# Patient Record
Sex: FEMALE | Race: WHITE | NOT HISPANIC OR LATINO | Employment: PART TIME | URBAN - METROPOLITAN AREA
[De-identification: names, ages, dates, MRNs, and addresses within clinical notes are randomized per-mention and may not be internally consistent; named-entity substitution may affect disease eponyms.]

---

## 2017-03-06 ENCOUNTER — HOSPITAL ENCOUNTER (EMERGENCY)
Facility: HOSPITAL | Age: 32
Discharge: HOME | End: 2017-03-06
Attending: EMERGENCY MEDICINE
Payer: MEDICAID

## 2017-03-06 VITALS
WEIGHT: 105 LBS | BODY MASS INDEX: 19.32 KG/M2 | OXYGEN SATURATION: 100 % | TEMPERATURE: 98 F | HEIGHT: 62 IN | SYSTOLIC BLOOD PRESSURE: 132 MMHG | HEART RATE: 79 BPM | DIASTOLIC BLOOD PRESSURE: 83 MMHG | RESPIRATION RATE: 17 BRPM

## 2017-03-06 DIAGNOSIS — M79.676 TOE PAIN: ICD-10-CM

## 2017-03-06 DIAGNOSIS — S90.122A CONTUSION OF LESSER TOE OF LEFT FOOT WITHOUT DAMAGE TO NAIL, INITIAL ENCOUNTER: Primary | ICD-10-CM

## 2017-03-06 PROCEDURE — 63600175 PHARM REV CODE 636 W HCPCS: Performed by: PHYSICIAN ASSISTANT

## 2017-03-06 PROCEDURE — 96372 THER/PROPH/DIAG INJ SC/IM: CPT

## 2017-03-06 PROCEDURE — 99283 EMERGENCY DEPT VISIT LOW MDM: CPT | Mod: 25

## 2017-03-06 RX ORDER — IBUPROFEN 600 MG/1
600 TABLET ORAL EVERY 6 HOURS PRN
Qty: 30 TABLET | Refills: 0 | OUTPATIENT
Start: 2017-03-06 | End: 2019-09-08

## 2017-03-06 RX ORDER — SULFAMETHOXAZOLE AND TRIMETHOPRIM 800; 160 MG/1; MG/1
2 TABLET ORAL 2 TIMES DAILY
Qty: 28 TABLET | Refills: 0 | Status: SHIPPED | OUTPATIENT
Start: 2017-03-06 | End: 2017-03-13

## 2017-03-06 RX ORDER — KETOROLAC TROMETHAMINE 30 MG/ML
10 INJECTION, SOLUTION INTRAMUSCULAR; INTRAVENOUS
Status: COMPLETED | OUTPATIENT
Start: 2017-03-06 | End: 2017-03-06

## 2017-03-06 RX ADMIN — KETOROLAC TROMETHAMINE 10 MG: 30 INJECTION, SOLUTION INTRAMUSCULAR at 09:03

## 2017-03-06 NOTE — Clinical Note
Call and schedule appt with [facility] to establish primary care physician, and to reevaluate toe pain.    Return to ED if unable to tolerate pain, if toe becomes red/swollen/warm, if unable to tolerate medicines.

## 2017-03-06 NOTE — ED AVS SNAPSHOT
[facility]  [place] [address]               Jordana Gallardo Ryan   3/6/2017  8:12 PM   ED    Description:  Female : 1985   Department:  [facility]           Your Care was Coordinated By:     Provider Role From To    Portia Jon MD Attending Provider 17 --    Inocencio Agrawal PA-C Physician Assistant 17 --      Reason for Visit     Foot Pain           Diagnoses this Visit        Comments    Contusion of lesser toe of left foot without damage to nail, initial encounter    -  Primary     Toe pain           ED Disposition     ED Disposition Condition Comment    Discharge  Call and schedule appt with [facility] to establish primary care physician, and to reevaluate toe pain.    Return to ED if unable to tolerate pain, if toe becomes red/swollen/warm, if unable to tolerate medicines.            To Do List           Follow-up Information     Follow up with [facility]. Schedule an appointment as soon as possible for a visit in 1 week.    Why:  Call and schedule appt with [facility] to establish primary care physician, and for reevaluation of toe pain.    Contact information:    [address] [place] [facility] [id number]  [phone]          Follow up with [facility].    Specialty:  Emergency Medicine    Why:  As needed, If symptoms worsen, if unable to tolerate pain, if toe becomes red/swollen/warm.     Contact information:    [place] [id number] [phone]       These Medications        Disp Refills Start End    sulfamethoxazole-trimethoprim 800-160mg (BACTRIM DS) 800-160 mg Tab 28 tablet 0 3/6/2017 3/13/2017    Take 2 tablets by mouth 2 (two) times daily. - Oral    ibuprofen (ADVIL,MOTRIN) 600 MG tablet 30 tablet 0 3/6/2017     Take 1 tablet (600 mg total) by mouth every 6 (six) hours as needed for Pain. - Oral       [facility]     [facility] - 24/7 Assistance  Registered nurses in the [facility] provide clinical advisement, health education, appointment booking, and other advisory services.  Call for this free service at [phone].             Medications           Message regarding Medications     Verify the changes and/or additions to your medication regime listed below are the same as discussed with your clinician today.  If any of these changes or additions are incorrect, please notify your healthcare provider.        START taking these NEW medications        Refills    sulfamethoxazole-trimethoprim 800-160mg (BACTRIM DS) 800-160 mg Tab 0    Sig: Take 2 tablets by mouth 2 (two) times daily.    Class: Print    Route: Oral    ibuprofen (ADVIL,MOTRIN) 600 MG tablet 0    Sig: Take 1 tablet (600 mg total) by mouth every 6 (six) hours as needed for Pain.    Class: Print    Route: Oral      These medications were administered today        Dose Freq    ketorolac injection 10 mg 10 mg ED 1 Time    Sig: Inject 10 mg into the muscle ED 1 Time.    Class: Normal    Route: Intramuscular    Cosign for Ordering: Accepted by Portia Jon MD on 3/6/2017  9:18 PM           Verify that the below list of medications is an accurate representation of the medications you are currently taking.  If none reported, the list may be blank. If incorrect, please contact your healthcare provider. Carry this list with you in case of emergency.           Current Medications     hydrocodone-acetaminophen 5-325mg (NORCO) 5-325 mg per tablet Take 1 tablet by mouth every 6 (six) hours as needed. No alcohol, no driving, no operating machinery, no working, no swimming, no extra Tylenol (acetaminophen) while taking this medication.    ibuprofen (ADVIL,MOTRIN) 600 MG tablet Take 1 tablet (600 mg total) by mouth every 6 (six) hours as needed for Pain.    ibuprofen (ADVIL,MOTRIN) 800 MG tablet Take 800 mg by mouth 3  "(three) times daily.    sulfamethoxazole-trimethoprim 800-160mg (BACTRIM DS) 800-160 mg Tab Take 2 tablets by mouth 2 (two) times daily.           Clinical Reference Information           Your Vitals Were     BP Pulse Temp Resp Height Weight    132/83 (BP Location: Left arm, Patient Position: Sitting) 79 98.1 °F (36.7 °C) (Oral) 17 5' 2" (1.575 m) 47.6 kg (105 lb)    SpO2 BMI             100% 19.2 kg/m2         Allergies as of 3/6/2017        Reactions    Percocet [Oxycodone-acetaminophen]       Immunizations Administered on Date of Encounter - 3/6/2017     None      ED Micro, Lab, POCT     None      ED Imaging Orders     Start Ordered       Status Ordering Provider    03/06/17 2057 03/06/17 2057  X-Ray Foot Complete Left  1 time imaging      Final result         Discharge Instructions         Understanding Bone Bruise (Bone Contusion)  A bone bruise is an injury to a bone that is less severe than a bone fracture. Bone bruises are fairly common. They can happen to people of all ages. Any type of bone in your body can get a bone bruise. Other injuries often happen along with a bone bruise, such as damage to nearby ligaments.  What happens when a bone is bruised?  Bone is made of different kinds of tissue. The periosteum is a thin layer of tissue that covers most of a bone. Where bones come together, there is usually a layer of cartilage at the edges. The bone here is called subchondral bone. Deep inside the bone is an area called the medulla. It contains the bone marrow and fibrous tissue called trabeculae.  With a bone fracture, all of the trabeculae in a region of bone have broken. But with a bone bruise, an injury only damages some of these trabeculae. An injury might cause blood to build up in the area beneath the periosteum. This causes a subperiosteal hematoma, a type of bone bruise. An injury might also cause bleeding and swelling in the area between your cartilage and the bone beneath it. This causes a " subchondral bone bruise. Or bleeding and swelling can occur in the medulla of your bone. This is called an interosseous bone bruise.  What causes a bone bruise?  Injury of any kind can cause a bone bruise. Sports injuries, motor vehicle accidents, or falls from a height can cause them. Twisting injuries that cause joint sprains can also cause a bone bruise. Health conditions like arthritis may also lead to a bone bruise. This is because arthritis causes bone surfaces to grind against each other. Child abuse is another cause of bone bruises.  Symptoms of a bone bruise  Symptoms of a bone bruise can include:  · Pain and soreness in the injured area  · Swelling in the area and soft tissues around it  · Change in color of the injured area  · Swelling or stiffness of an injured joint  This pain is often more severe and lasts longer than a soft tissue injury. How severe your symptoms are and how long they last depends on how severe the bone bruise is.  Diagnosing a bone bruise  Your healthcare provider will ask you about your medical history and symptoms. He or she will ask how you got your injury. Your provider will examine the injured area to check for pain, bruising, and swelling. After the exam, your health care provider may be able to tell if you have a bone bruise.  A bone bruise doesnt show up on an X-ray. But you may be given an X-ray to rule out a bone fracture. A fracture may need a different kind of treatment. An MRI can confirm a bone bruise. But your healthcare provider will likely only give you an MRI if your symptoms dont get better.  Date Last Reviewed: 3/3/2015  © 6593-7893 [employer] [address] [place] [address], [place] [address]. All rights reserved. This information is not intended as a substitute for professional medical care. Always follow your healthcare professional's instructions.          Discharge References/Attachments     R.I.C.E. (ENGLISH)    LOWER EXTREMITY CONTUSION (ENGLISH)       [facility] Sign-Up     Activating your [facility] account is as easy as 1-2-3!     1) Visit [email or web address], select Sign Up Now, enter this activation code and your date of birth, then select Next.  [email or web address]  Expires: 4/20/2017  9:48 PM      2) Create a username and password to use when you visit [facility] in the future and select a security question in case you lose your password and select Next.    3) Enter your e-mail address and click Sign Up!    Additional Information  If you have questions, please e-mail [email or web address] or call [phone] to talk to our [facility] staff. Remember, [facility] is NOT to be used for urgent needs. For medical emergencies, dial 911.         Smoking Cessation     If you would like to quit smoking:   You may be eligible for free services if you are a [place] resident and started smoking cigarettes before September 1, 1988.  Call the Smoking Cessation Trust (SCT) toll free at (058) 678-5363 or (325) 294-4504.   Call 5-044-QUIT-NOW if you do not meet the above criteria.             [facility] complies with applicable Federal civil rights laws and does not discriminate on the basis of race, color, national origin, age, disability, or sex.        Language Assistance Services     ATTENTION: Language assistance services are available, free of charge. Please call [phone].      ATENCIÓN: Si habla español, tiene a thompson disposición servicios gratuitos de asistencia lingüística. Llame al [phone].     CHÚ Ý: N?u b?n nói Ti?ng Vi?t, có các d?ch v? h? tr? ngôn ng? mi?n phí dành cho b?n. G?i s? [phone].

## 2017-03-07 NOTE — ED PROVIDER NOTES
"Encounter Date: 3/6/2017       History     Chief Complaint   Patient presents with    Foot Pain     "This morning, I dropped a can on my toe and I feel worsening pain.      Review of patient's allergies indicates:   Allergen Reactions    Percocet [oxycodone-acetaminophen]      HPI Comments: 33yo f with no significant pmh on file presents to ED with chief complaint L toe pain after dropping can on foot earlier today. Pt states L 4th toe is bruised and has a tingling/numb sensation. She states pain is constant. She is able to ambulate, but cannot put much pressure on forefoot. Pt denies wound or bleeding. Denies alleviating factors; pain exacerbated by movement or touch.     The history is provided by the patient.     Past Medical History:   Diagnosis Date    Irregular heart rate     Ovarian cyst      Past Surgical History:   Procedure Laterality Date    DILATION AND CURETTAGE OF UTERUS      PARTIAL HYSTERECTOMY      TUBAL LIGATION       History reviewed. No pertinent family history.  Social History   Substance Use Topics    Smoking status: Current Every Day Smoker    Smokeless tobacco: None    Alcohol use No     Review of Systems   Constitutional: Negative for activity change, chills and fever.   HENT: Negative.    Eyes: Negative.    Respiratory: Negative.    Cardiovascular: Negative.    Gastrointestinal: Negative.    Endocrine: Negative.    Genitourinary: Negative.    Musculoskeletal: Negative for back pain, neck pain and neck stiffness.   Skin: Positive for color change. Negative for pallor and rash.   Allergic/Immunologic: Negative.    Neurological: Negative.    Hematological: Negative.    Psychiatric/Behavioral: Negative.    All other systems reviewed and are negative.      Physical Exam   Initial Vitals   BP Pulse Resp Temp SpO2   03/06/17 2005 03/06/17 2005 03/06/17 2005 03/06/17 2005 03/06/17 2005   132/83 79 17 98.1 °F (36.7 °C) 100 %     Physical Exam    Nursing note and vitals " reviewed.  Constitutional: She appears well-developed and well-nourished. No distress.   HENT:   Head: Normocephalic and atraumatic.   Eyes: Conjunctivae and EOM are normal.   Neck: Normal range of motion. Neck supple.   Cardiovascular: Normal heart sounds and intact distal pulses.   No murmur heard.  Pulmonary/Chest: Breath sounds normal. No respiratory distress. She has no wheezes. She has no rhonchi. She exhibits no tenderness.   Abdominal: Soft. There is no tenderness.   Musculoskeletal: She exhibits no edema.        Feet:    Lymphadenopathy:     She has no cervical adenopathy.   Neurological: She is alert and oriented to person, place, and time. She has normal strength.   Skin: Skin is warm and dry. No rash and no abscess noted. No erythema.   Psychiatric: She has a normal mood and affect. Her behavior is normal. Judgment and thought content normal.         ED Course   Procedures  Labs Reviewed - No data to display          Medical Decision Making:   Initial Assessment:   31yo f with chief complaint toe pain after dropping object on toe earlier today  Differential Diagnosis:   Contusion, fracture of foot/toe, crush injury  Clinical Tests:   Radiological Study: Ordered and Reviewed  ED Management:  Pt is overall well-appearing, in NAD, afebrile, vitals WNL. She does have obvious ecchymosis to toe with decreased ROM 2/2 pain. Xray without evidence of fracture or dislocation. No bony deformity via imaging. I do think this is consistent with a contusion of the toe without fracture or open wound. Cap refill <3 sec. 2+DP to involved foot. I do think she would benefit from short course of antibiotics because of the mechanism of this injury being a crush injury, and also NSAIDs for pain. RICE instructions given. I will give her information for f/u with [facility]. Return precautions given.  Other:   I have discussed this case with another health care provider.       <> Summary of the Discussion: I did discuss  this case with .                   ED Course     Clinical Impression:   The primary encounter diagnosis was Contusion of lesser toe of left foot without damage to nail, initial encounter. A diagnosis of Toe pain was also pertinent to this visit.    Disposition:   Disposition: Discharged  Condition: Stable       Inocencio Agrawal PA-C  03/07/17 0010

## 2017-03-07 NOTE — ED TRIAGE NOTES
Patient states she dropped a big can cheese on her left foot at 10 am. Patient is able to walk on her foot.

## 2017-03-07 NOTE — DISCHARGE INSTRUCTIONS
Understanding Bone Bruise (Bone Contusion)  A bone bruise is an injury to a bone that is less severe than a bone fracture. Bone bruises are fairly common. They can happen to people of all ages. Any type of bone in your body can get a bone bruise. Other injuries often happen along with a bone bruise, such as damage to nearby ligaments.  What happens when a bone is bruised?  Bone is made of different kinds of tissue. The periosteum is a thin layer of tissue that covers most of a bone. Where bones come together, there is usually a layer of cartilage at the edges. The bone here is called subchondral bone. Deep inside the bone is an area called the medulla. It contains the bone marrow and fibrous tissue called trabeculae.  With a bone fracture, all of the trabeculae in a region of bone have broken. But with a bone bruise, an injury only damages some of these trabeculae. An injury might cause blood to build up in the area beneath the periosteum. This causes a subperiosteal hematoma, a type of bone bruise. An injury might also cause bleeding and swelling in the area between your cartilage and the bone beneath it. This causes a subchondral bone bruise. Or bleeding and swelling can occur in the medulla of your bone. This is called an interosseous bone bruise.  What causes a bone bruise?  Injury of any kind can cause a bone bruise. Sports injuries, motor vehicle accidents, or falls from a height can cause them. Twisting injuries that cause joint sprains can also cause a bone bruise. Health conditions like arthritis may also lead to a bone bruise. This is because arthritis causes bone surfaces to grind against each other. Child abuse is another cause of bone bruises.  Symptoms of a bone bruise  Symptoms of a bone bruise can include:  · Pain and soreness in the injured area  · Swelling in the area and soft tissues around it  · Change in color of the injured area  · Swelling or stiffness of an injured joint  This pain is often  more severe and lasts longer than a soft tissue injury. How severe your symptoms are and how long they last depends on how severe the bone bruise is.  Diagnosing a bone bruise  Your healthcare provider will ask you about your medical history and symptoms. He or she will ask how you got your injury. Your provider will examine the injured area to check for pain, bruising, and swelling. After the exam, your health care provider may be able to tell if you have a bone bruise.  A bone bruise doesnt show up on an X-ray. But you may be given an X-ray to rule out a bone fracture. A fracture may need a different kind of treatment. An MRI can confirm a bone bruise. But your healthcare provider will likely only give you an MRI if your symptoms dont get better.  Date Last Reviewed: 3/3/2015  © 4934-7956 [employer], [employer]. [address] [place] [address]. All rights reserved. This information is not intended as a substitute for professional medical care. Always follow your healthcare professional's instructions.

## 2017-07-06 ENCOUNTER — HOSPITAL ENCOUNTER (EMERGENCY)
Facility: OTHER | Age: 32
Discharge: HOME | End: 2017-07-06
Attending: EMERGENCY MEDICINE
Payer: MEDICAID

## 2017-07-06 VITALS
WEIGHT: 107 LBS | OXYGEN SATURATION: 99 % | DIASTOLIC BLOOD PRESSURE: 80 MMHG | SYSTOLIC BLOOD PRESSURE: 121 MMHG | BODY MASS INDEX: 19.69 KG/M2 | RESPIRATION RATE: 17 BRPM | HEART RATE: 78 BPM | HEIGHT: 62 IN | TEMPERATURE: 98 F

## 2017-07-06 DIAGNOSIS — R18.8 FREE FLUID IN PELVIS: ICD-10-CM

## 2017-07-06 DIAGNOSIS — R10.31 RIGHT LOWER QUADRANT ABDOMINAL PAIN: Primary | ICD-10-CM

## 2017-07-06 DIAGNOSIS — R10.31 RIGHT LOWER QUADRANT PAIN: ICD-10-CM

## 2017-07-06 LAB
ALBUMIN SERPL-MCNC: 4 G/DL (ref 3.3–5.5)
ALP SERPL-CCNC: 47 U/L (ref 42–141)
BILIRUB SERPL-MCNC: 0.9 MG/DL (ref 0.2–1.6)
BILIRUBIN, POC UA: NEGATIVE
BLOOD, POC UA: NEGATIVE
BUN SERPL-MCNC: 6 MG/DL (ref 7–22)
CALCIUM SERPL-MCNC: 9.6 MG/DL (ref 8–10.3)
CHLORIDE SERPL-SCNC: 103 MMOL/L (ref 98–108)
CLARITY, POC UA: CLEAR
COLOR, POC UA: ABNORMAL
CREAT SERPL-MCNC: 0.3 MG/DL (ref 0.6–1.2)
GLUCOSE SERPL-MCNC: 72 MG/DL (ref 73–118)
GLUCOSE, POC UA: NEGATIVE
KETONES, POC UA: NEGATIVE
LEUKOCYTE EST, POC UA: NEGATIVE
NITRITE, POC UA: NEGATIVE
PH UR STRIP: 7 [PH]
POC ALT (SGPT): 17 U/L (ref 10–47)
POC AST (SGOT): 29 U/L (ref 11–38)
POC TCO2: 27 MMOL/L (ref 18–33)
POTASSIUM BLD-SCNC: 3.9 MMOL/L (ref 3.6–5.1)
PROTEIN, POC UA: NEGATIVE
PROTEIN, POC: 6.8 G/DL (ref 6.4–8.1)
SODIUM BLD-SCNC: 137 MMOL/L (ref 128–145)
SPECIFIC GRAVITY, POC UA: 1.01
UROBILINOGEN, POC UA: 0.2 E.U./DL

## 2017-07-06 PROCEDURE — 96372 THER/PROPH/DIAG INJ SC/IM: CPT

## 2017-07-06 PROCEDURE — 96375 TX/PRO/DX INJ NEW DRUG ADDON: CPT

## 2017-07-06 PROCEDURE — 63600175 PHARM REV CODE 636 W HCPCS: Performed by: EMERGENCY MEDICINE

## 2017-07-06 PROCEDURE — 96374 THER/PROPH/DIAG INJ IV PUSH: CPT

## 2017-07-06 PROCEDURE — 85025 COMPLETE CBC W/AUTO DIFF WBC: CPT

## 2017-07-06 PROCEDURE — 25000003 PHARM REV CODE 250: Performed by: EMERGENCY MEDICINE

## 2017-07-06 PROCEDURE — 96361 HYDRATE IV INFUSION ADD-ON: CPT

## 2017-07-06 PROCEDURE — 99284 EMERGENCY DEPT VISIT MOD MDM: CPT | Mod: 25

## 2017-07-06 PROCEDURE — 80053 COMPREHEN METABOLIC PANEL: CPT

## 2017-07-06 PROCEDURE — 87086 URINE CULTURE/COLONY COUNT: CPT

## 2017-07-06 PROCEDURE — 25500020 PHARM REV CODE 255: Performed by: EMERGENCY MEDICINE

## 2017-07-06 PROCEDURE — 81003 URINALYSIS AUTO W/O SCOPE: CPT

## 2017-07-06 RX ORDER — MORPHINE SULFATE 10 MG/ML
4 INJECTION INTRAMUSCULAR; INTRAVENOUS; SUBCUTANEOUS
Status: COMPLETED | OUTPATIENT
Start: 2017-07-06 | End: 2017-07-06

## 2017-07-06 RX ORDER — ONDANSETRON 2 MG/ML
4 INJECTION INTRAMUSCULAR; INTRAVENOUS
Status: COMPLETED | OUTPATIENT
Start: 2017-07-06 | End: 2017-07-06

## 2017-07-06 RX ORDER — IBUPROFEN 600 MG/1
600 TABLET ORAL EVERY 8 HOURS PRN
Qty: 15 TABLET | Refills: 0 | OUTPATIENT
Start: 2017-07-06 | End: 2019-09-08

## 2017-07-06 RX ORDER — TRAMADOL HYDROCHLORIDE 50 MG/1
50 TABLET ORAL EVERY 8 HOURS PRN
Qty: 12 TABLET | Refills: 0 | Status: SHIPPED | OUTPATIENT
Start: 2017-07-06 | End: 2017-07-16

## 2017-07-06 RX ORDER — SODIUM CHLORIDE 9 MG/ML
1000 INJECTION, SOLUTION INTRAVENOUS
Status: COMPLETED | OUTPATIENT
Start: 2017-07-06 | End: 2017-07-06

## 2017-07-06 RX ORDER — KETOROLAC TROMETHAMINE 30 MG/ML
15 INJECTION, SOLUTION INTRAMUSCULAR; INTRAVENOUS ONCE
Status: COMPLETED | OUTPATIENT
Start: 2017-07-06 | End: 2017-07-06

## 2017-07-06 RX ADMIN — IOHEXOL 75 ML: 350 INJECTION, SOLUTION INTRAVENOUS at 01:07

## 2017-07-06 RX ADMIN — KETOROLAC TROMETHAMINE 15 MG: 30 INJECTION, SOLUTION INTRAMUSCULAR at 12:07

## 2017-07-06 RX ADMIN — SODIUM CHLORIDE 1000 ML: 0.9 INJECTION, SOLUTION INTRAVENOUS at 12:07

## 2017-07-06 RX ADMIN — MORPHINE SULFATE 4 MG: 10 INJECTION INTRAVENOUS at 04:07

## 2017-07-06 RX ADMIN — ONDANSETRON 4 MG: 2 INJECTION INTRAMUSCULAR; INTRAVENOUS at 04:07

## 2017-07-06 NOTE — ED PROVIDER NOTES
Encounter Date: 7/6/2017       History     Chief Complaint   Patient presents with    Abdominal Pain     reports constant right lower abd radiating to right lower back, onset 2 days ago      Ms Davis reports 4 days of RLQ pain, intermittently radiating to R flank. Positive mild anorexia w sweats, no chills or fever. Hx of hysterectomy years ago. Pain is not sharp at any time, but is constant. No urinary sxs. Reports hx of ovarian cysts in past causing pain, last was a year ago, but this feels different, lasting longer, more pain. Also feels bloated after eating.       The history is provided by the patient.     Review of patient's allergies indicates:   Allergen Reactions    Percocet [oxycodone-acetaminophen]      Past Medical History:   Diagnosis Date    Irregular heart rate     Ovarian cyst      Past Surgical History:   Procedure Laterality Date    DILATION AND CURETTAGE OF UTERUS      PARTIAL HYSTERECTOMY      TUBAL LIGATION       History reviewed. No pertinent family history.  Social History   Substance Use Topics    Smoking status: Current Every Day Smoker    Smokeless tobacco: Never Used    Alcohol use No     Review of Systems   Constitutional: Positive for diaphoresis. Negative for appetite change, chills, fatigue and fever.   Respiratory: Negative.    Cardiovascular: Negative.    Gastrointestinal: Positive for abdominal pain. Negative for constipation, diarrhea, nausea and vomiting.   Musculoskeletal: Negative.    All other systems reviewed and are negative.      Physical Exam     Initial Vitals [07/06/17 1144]   BP Pulse Resp Temp SpO2   125/75 89 17 98.8 °F (37.1 °C) 98 %      MAP       91.67         Physical Exam    Nursing note and vitals reviewed.  Constitutional: She appears well-developed and well-nourished. She is not diaphoretic. No distress.   HENT:   Head: Normocephalic and atraumatic.   Eyes: EOM are normal. Pupils are equal, round, and reactive to light.   Neck: Normal range of  motion. Neck supple.   Cardiovascular: Normal rate, regular rhythm and normal heart sounds.   No murmur heard.  Pulmonary/Chest: Breath sounds normal. She has no wheezes.   Abdominal: Soft. She exhibits no mass. There is tenderness. There is no rebound and no guarding.   Diffuse  RLQ ttp. No flank ttp. Mild bloating noted, mild lower abd distension   Musculoskeletal: Normal range of motion. She exhibits no edema or tenderness.   Neurological: She is alert and oriented to person, place, and time. She has normal strength. No cranial nerve deficit.   Skin: Skin is warm and dry. No rash noted. No erythema.   Psychiatric: She has a normal mood and affect. Her behavior is normal. Thought content normal.         ED Course   Procedures  Labs Reviewed   POCT URINALYSIS W/O SCOPE - Abnormal; Notable for the following:        Result Value    Glucose, UA Negative (*)     Bilirubin, UA Negative (*)     Ketones, UA Negative (*)     Blood, UA Negative (*)     Protein, UA Negative (*)     Nitrite, UA Negative (*)     Leukocytes, UA Negative (*)     All other components within normal limits   POCT CMP - Abnormal; Notable for the following:     POC BUN 6 (*)     POC Creatinine 0.3 (*)     POC Glucose 72 (*)     All other components within normal limits   CULTURE, URINE   POCT CBC   POCT URINALYSIS W/O SCOPE   POCT CMP             Medical Decision Making:   Clinical Tests:   Lab Tests: Ordered and Reviewed  The following lab test(s) were unremarkable: CBC, CMP and Urinalysis  Radiological Study: Ordered and Reviewed  ED Management:  Ms Davis has been stable during her time in the ER and is stable for discharge. She wants to go home. She feels most pain was felt with manipulation of R adnexa during pelvic US. Results discussed w pt. ddx includes ruptured ovarian cyst, endometrial tissue causing R ovarian pain, other pathology. She will f/u w obgyn. We discussed worrisome signs that should prompt need to return to er should they  occur. There is no indication for further emergent intervention or evaluation at this time.              Imaging Results          US Pelvis Comp with Transvag NON-OB (xpd) (Final result)  Result time 07/06/17 16:45:05   Procedure changed from US Pelvis Limited Non OB     Final result by Vani Du MD (07/06/17 16:45:05)                 Impression:      The patient is status post hysterectomy.      Electronically signed by: VANI DU MD  Date:     07/06/17  Time:    16:45              Narrative:    Pelvic ultrasound    History: Right lower quadrant pain    Comparison: None    Technique: Transabdominal and transvaginal gray scale, spectral Doppler and color Doppler imaging of the pelvis was acquired.    Findings:  The uterus has been removed.     The right ovary measures 2.5 by 2.4 by 2.6 centimeters. It has a normal sonographic appearance with normal flow.     The left ovary measures 3.2 by 3.5 by 2.0 centimeters. It has a normal sonographic appearance with normal flow.     There is a physiologic amount of free fluid seen within the pelvis.                             CT Abdomen Pelvis With Contrast (Final result)  Result time 07/06/17 14:11:17    Final result by Santana Mcpherson MD (07/06/17 14:11:17)                 Impression:      1.  No evidence for appendicitis.  2.  Trace free pelvic fluid, likely physiologic.      Electronically signed by: SANTANA MCPHERSON MD  Date:     07/06/17  Time:    14:11              Narrative:    CT abdomen and pelvis    Technique: Helical CT scan abdomen and pelvis performed with 75 mL Omnipaque 350 intravenous contrast.    Comparison: 3/17/16.    Findings:    Lung bases are clear.  No pericardial or pleural effusion.    Liver, gallbladder, pancreas, spleen, adrenal glands, and kidneys are unremarkable.  No biliary dilatation.  No hydronephrosis.    GI tract is nonobstructed.  The appendix is normal caliber.    Status post hysterectomy.  No adnexal masses.  Trace free fluid  noted in the pelvis.  Urinary bladder is unremarkable.    No retroperitoneal or mesenteric lymphadenopathy.  Abdominal aorta is normal caliber.    Regional osseous structures demonstrate no aggressive appearing lytic or blastic lesions.                                  Admission on 07/06/2017, Discharged on 07/06/2017   Component Date Value Ref Range Status    Glucose, UA 07/06/2017 Negative*  Final    Bilirubin, UA 07/06/2017 Negative*  Final    Ketones, UA 07/06/2017 Negative*  Final    Spec Grav UA 07/06/2017 1.010   Final    Blood, UA 07/06/2017 Negative*  Final    PH, UA 07/06/2017 7.0   Final    Protein, UA 07/06/2017 Negative*  Final    Urobilinogen, UA 07/06/2017 0.2  E.U./dL Final    Nitrite, UA 07/06/2017 Negative*  Final    Leukocytes, UA 07/06/2017 Negative*  Final    Color, UA 07/06/2017 Light yellow   Final    Clarity, UA 07/06/2017 Clear   Final    Urine Culture, Routine 07/07/2017 No significant growth   Final    Albumin, POC 07/06/2017 4.0  3.3 - 5.5 g/dL Final    Alkaline Phosphatase, POC 07/06/2017 47  42 - 141 U/L Final    ALT (SGPT), POC 07/06/2017 17  10 - 47 U/L Final    AST (SGOT), POC 07/06/2017 29  11 - 38 U/L Final    POC BUN 07/06/2017 6* 7 - 22 mg/dL Final    Calcium, POC 07/06/2017 9.6  8.0 - 10.3 mg/dL Final    POC Chloride 07/06/2017 103  98 - 108 mmol/L Final    POC Creatinine 07/06/2017 0.3* 0.6 - 1.2 mg/dL Final    POC Glucose 07/06/2017 72* 73 - 118 mg/dL Final    POC Potassium 07/06/2017 3.9  3.6 - 5.1 mmol/L Final    POC Sodium 07/06/2017 137  128 - 145 mmol/L Final    Bilirubin 07/06/2017 0.9  0.2 - 1.6 mg/dL Final    POC TCO2 07/06/2017 27  18 - 33 mmol/L Final    Protein 07/06/2017 6.8  6.4 - 8.1 g/dL Final               ED Course     Clinical Impression:   The primary encounter diagnosis was Right lower quadrant abdominal pain. Diagnoses of Right lower quadrant pain and Free fluid in pelvis were also pertinent to this visit.                            Coni Zimmer MD  07/11/17 1318       Coni Zimmer MD  07/11/17 1317

## 2017-07-06 NOTE — DISCHARGE INSTRUCTIONS
Rest. Drink plenty of fluids. Return for any new or acute problems or concerns. Follow up with your primary obgyn in a few days for recheck.

## 2017-07-07 LAB — BACTERIA UR CULT: NORMAL

## 2018-02-28 ENCOUNTER — HOSPITAL ENCOUNTER (EMERGENCY)
Facility: OTHER | Age: 33
Discharge: HOME | End: 2018-02-28
Attending: EMERGENCY MEDICINE
Payer: MEDICAID

## 2018-02-28 VITALS
SYSTOLIC BLOOD PRESSURE: 162 MMHG | TEMPERATURE: 99 F | WEIGHT: 106 LBS | RESPIRATION RATE: 18 BRPM | OXYGEN SATURATION: 98 % | HEIGHT: 62 IN | DIASTOLIC BLOOD PRESSURE: 97 MMHG | BODY MASS INDEX: 19.51 KG/M2 | HEART RATE: 78 BPM

## 2018-02-28 DIAGNOSIS — S16.1XXA CERVICAL STRAIN, ACUTE, INITIAL ENCOUNTER: Primary | ICD-10-CM

## 2018-02-28 DIAGNOSIS — R52 PAIN: ICD-10-CM

## 2018-02-28 PROCEDURE — 63600175 PHARM REV CODE 636 W HCPCS: Performed by: EMERGENCY MEDICINE

## 2018-02-28 PROCEDURE — 99284 EMERGENCY DEPT VISIT MOD MDM: CPT | Mod: 25

## 2018-02-28 PROCEDURE — 96372 THER/PROPH/DIAG INJ SC/IM: CPT

## 2018-02-28 RX ORDER — TRAMADOL HYDROCHLORIDE 50 MG/1
50 TABLET ORAL EVERY 6 HOURS PRN
Qty: 12 TABLET | Refills: 0 | Status: SHIPPED | OUTPATIENT
Start: 2018-02-28 | End: 2018-03-10

## 2018-02-28 RX ORDER — PREDNISONE 10 MG/1
10 TABLET ORAL DAILY
Qty: 5 TABLET | Refills: 0 | Status: SHIPPED | OUTPATIENT
Start: 2018-02-28 | End: 2018-03-05

## 2018-02-28 RX ORDER — KETOROLAC TROMETHAMINE 30 MG/ML
30 INJECTION, SOLUTION INTRAMUSCULAR; INTRAVENOUS
Status: COMPLETED | OUTPATIENT
Start: 2018-02-28 | End: 2018-02-28

## 2018-02-28 RX ORDER — METHOCARBAMOL 500 MG/1
1000 TABLET, FILM COATED ORAL 3 TIMES DAILY
Qty: 15 TABLET | Refills: 0 | Status: SHIPPED | OUTPATIENT
Start: 2018-02-28 | End: 2018-03-05

## 2018-02-28 RX ORDER — ORPHENADRINE CITRATE 30 MG/ML
60 INJECTION INTRAMUSCULAR; INTRAVENOUS
Status: COMPLETED | OUTPATIENT
Start: 2018-02-28 | End: 2018-02-28

## 2018-02-28 RX ADMIN — KETOROLAC TROMETHAMINE 30 MG: 30 INJECTION, SOLUTION INTRAMUSCULAR at 10:02

## 2018-02-28 RX ADMIN — ORPHENADRINE CITRATE 60 MG: 30 INJECTION INTRAMUSCULAR; INTRAVENOUS at 10:02

## 2018-03-01 NOTE — ED TRIAGE NOTES
Pt arrived with complaint of rt neck pain that radiates down rt arm after carrying a laundry basket. Pt states she has admin herself tylenol #3 aprroximately 1600 with no relief.

## 2018-03-01 NOTE — ED PROVIDER NOTES
Encounter Date: 2/28/2018       History     Chief Complaint   Patient presents with    Neck Pain     This patient states that she was carrying a heavy laundry basket went to put on a counter she missed the counter and jolted her right neck area she is complaining of pain in her right shoulder girdle      The history is provided by the patient.   Neck Pain    This is a new problem. The current episode started yesterday. The problem occurs constantly. The problem has been unchanged. Associated with: See above. The pain is present in the right side. The pain radiates to the right shoulder. The pain is at a severity of 7/10. The symptoms are aggravated by bending, twisting and position. The pain is the same all the time. Pertinent negatives include no photophobia, no visual change, no chest pain, no syncope, no numbness, no weight loss, no headaches, no bowel incontinence, no bladder incontinence, no leg pain, no paresis, no tingling and no weakness. She has tried nothing for the symptoms.     Review of patient's allergies indicates:   Allergen Reactions    Percocet [oxycodone-acetaminophen]      Past Medical History:   Diagnosis Date    Irregular heart rate     Ovarian cyst      Past Surgical History:   Procedure Laterality Date    DILATION AND CURETTAGE OF UTERUS      PARTIAL HYSTERECTOMY      TUBAL LIGATION       History reviewed. No pertinent family history.  Social History   Substance Use Topics    Smoking status: Current Every Day Smoker    Smokeless tobacco: Never Used    Alcohol use No     Review of Systems   Constitutional: Negative.  Negative for weight loss.   HENT: Negative.    Eyes: Negative.  Negative for photophobia.   Respiratory: Negative.    Cardiovascular: Negative.  Negative for chest pain and syncope.   Gastrointestinal: Negative.  Negative for bowel incontinence.   Endocrine: Negative.    Genitourinary: Negative.  Negative for bladder incontinence.   Musculoskeletal: Positive for neck  pain.   Skin: Negative.    Allergic/Immunologic: Negative.    Neurological: Negative.  Negative for tingling, weakness, numbness and headaches.   Hematological: Negative.    Psychiatric/Behavioral: Negative.    All other systems reviewed and are negative.      Physical Exam     Initial Vitals [02/28/18 2037]   BP Pulse Resp Temp SpO2   (!) 162/97 78 18 98.6 °F (37 °C) 98 %      MAP       118.67         Physical Exam    Nursing note and vitals reviewed.  Constitutional: Vital signs are normal. She appears well-developed. She is active and cooperative.   HENT:   Head: Normocephalic and atraumatic.   Eyes: Conjunctivae, EOM and lids are normal. Pupils are equal, round, and reactive to light.   Neck: Trachea normal and full passive range of motion without pain. Neck supple. No thyroid mass present. No spinous process tenderness and no muscular tenderness present. Decreased range of motion present. No edema and no erythema present. No neck rigidity.       Cardiovascular: Normal rate, regular rhythm, S1 normal, S2 normal, normal heart sounds, intact distal pulses and normal pulses.   Pulmonary/Chest: Effort normal and breath sounds normal.   Abdominal: Soft. Normal appearance, normal aorta and bowel sounds are normal.   Lymphadenopathy:     She has no axillary adenopathy.   Neurological: She is alert and oriented to person, place, and time. She has normal strength. No cranial nerve deficit or sensory deficit. She displays a negative Romberg sign. GCS eye subscore is 4. GCS verbal subscore is 5. GCS motor subscore is 6.   Skin: Skin is warm, dry and intact.   Psychiatric: She has a normal mood and affect. Her speech is normal and behavior is normal. Judgment and thought content normal. Cognition and memory are normal.         ED Course   Procedures  Labs Reviewed - No data to display              Imaging Results          X-Ray Cervical Spine AP And Lateral (Final result)  Result time 02/28/18 22:58:42    Final result by  Guero Cooper MD (02/28/18 22:58:42)                 Impression:      Straightening with reversal of the normal cervical lordosis.  No evidence of acute fracture or subluxation.      Electronically signed by: GUERO COOPER MD  Date:     02/28/18  Time:    22:58              Narrative:    Cervical spine AP, lateral, and odontoid views.  Comparison: None.    There is straightening with reversal of the normal cervical lordosis.  No evidence of acute cervical spine fracture or subluxation.  Odontoid process is intact.  Vertebral body heights and intervertebral disc spaces are maintained.  Surrounding soft tissues show no significant abnormalities.  Visualized lung apices are clear.                                                Clinical Impression:   The primary encounter diagnosis was Cervical strain, acute, initial encounter. A diagnosis of Pain was also pertinent to this visit.                           Federico Cantu MD  02/28/18 1171

## 2018-07-25 ENCOUNTER — HOSPITAL ENCOUNTER (EMERGENCY)
Facility: HOSPITAL | Age: 33
Discharge: HOME | End: 2018-07-25
Attending: EMERGENCY MEDICINE
Payer: MEDICAID

## 2018-07-25 VITALS
TEMPERATURE: 98 F | DIASTOLIC BLOOD PRESSURE: 93 MMHG | HEART RATE: 78 BPM | RESPIRATION RATE: 16 BRPM | SYSTOLIC BLOOD PRESSURE: 143 MMHG | WEIGHT: 110 LBS | OXYGEN SATURATION: 99 % | BODY MASS INDEX: 20.12 KG/M2

## 2018-07-25 DIAGNOSIS — F41.9 ANXIETY: ICD-10-CM

## 2018-07-25 DIAGNOSIS — M54.42 ACUTE LEFT-SIDED LOW BACK PAIN WITH LEFT-SIDED SCIATICA: Primary | ICD-10-CM

## 2018-07-25 DIAGNOSIS — R53.83 FATIGUE, UNSPECIFIED TYPE: ICD-10-CM

## 2018-07-25 LAB
ALBUMIN SERPL-MCNC: 4.7 G/DL (ref 3.3–5.5)
ALP SERPL-CCNC: 68 U/L (ref 42–141)
BILIRUB SERPL-MCNC: 0.6 MG/DL (ref 0.2–1.6)
BILIRUBIN, POC UA: NEGATIVE
BLOOD, POC UA: ABNORMAL
BUN SERPL-MCNC: 11 MG/DL (ref 7–22)
CALCIUM SERPL-MCNC: 9.8 MG/DL (ref 8–10.3)
CHLORIDE SERPL-SCNC: 101 MMOL/L (ref 98–108)
CLARITY, POC UA: CLEAR
COLOR, POC UA: YELLOW
CREAT SERPL-MCNC: 0.7 MG/DL (ref 0.6–1.2)
GLUCOSE SERPL-MCNC: 80 MG/DL (ref 73–118)
GLUCOSE, POC UA: NEGATIVE
KETONES, POC UA: ABNORMAL
LEUKOCYTE EST, POC UA: NEGATIVE
NITRITE, POC UA: NEGATIVE
PH UR STRIP: 6.5 [PH]
POC ALT (SGPT): 14 U/L (ref 10–47)
POC AST (SGOT): 28 U/L (ref 11–38)
POC TCO2: 27 MMOL/L (ref 18–33)
POTASSIUM BLD-SCNC: 3.7 MMOL/L (ref 3.6–5.1)
PROTEIN, POC UA: ABNORMAL
PROTEIN, POC: 7.2 G/DL (ref 6.4–8.1)
SODIUM BLD-SCNC: 140 MMOL/L (ref 128–145)
SPECIFIC GRAVITY, POC UA: >=1.03
UROBILINOGEN, POC UA: 0.2 E.U./DL

## 2018-07-25 PROCEDURE — 25000003 PHARM REV CODE 250: Performed by: EMERGENCY MEDICINE

## 2018-07-25 PROCEDURE — 80053 COMPREHEN METABOLIC PANEL: CPT

## 2018-07-25 PROCEDURE — 81003 URINALYSIS AUTO W/O SCOPE: CPT

## 2018-07-25 PROCEDURE — 85025 COMPLETE CBC W/AUTO DIFF WBC: CPT

## 2018-07-25 RX ORDER — METHYLPREDNISOLONE 4 MG/1
TABLET ORAL
Qty: 1 PACKAGE | Refills: 0 | OUTPATIENT
Start: 2018-07-25 | End: 2019-09-08

## 2018-07-25 RX ORDER — CYCLOBENZAPRINE HCL 10 MG
10 TABLET ORAL 3 TIMES DAILY PRN
Qty: 15 TABLET | Refills: 0 | Status: SHIPPED | OUTPATIENT
Start: 2018-07-25 | End: 2018-07-30

## 2018-07-25 RX ORDER — BACLOFEN 10 MG/1
10 TABLET ORAL 3 TIMES DAILY
COMMUNITY
End: 2022-01-02

## 2018-07-25 RX ORDER — KETOROLAC TROMETHAMINE 10 MG/1
10 TABLET, FILM COATED ORAL EVERY 6 HOURS
Qty: 20 TABLET | Refills: 0 | OUTPATIENT
Start: 2018-07-25 | End: 2019-09-08

## 2018-07-25 RX ADMIN — SODIUM CHLORIDE 1000 ML: 0.9 INJECTION, SOLUTION INTRAVENOUS at 02:07

## 2018-07-25 NOTE — ED PROVIDER NOTES
"Encounter Date: 7/25/2018    SCRIBE #1 NOTE: I, Alexander Andres, am scribing for, and in the presence of,  Dr. Denney . I have scribed the entire note.       History     Chief Complaint   Patient presents with    Back Pain     Pt states," Lower back pain for two weeks, now I just feel like I have no energy."     Time patient was seen by the provider: 1:54 PM      The patient is a 33 y.o. female with co-morbidities including: anxiety and back problems who presents to the ED with a complaint of constant left back pain that radiates to her left thigh for 3 weeks. Patient denies  falling. She  walks and climbs stairs often. When climbing the stairs, she sometimes catches herself on a high step but has not fallen.  She rates her back pain as a 8/10 in severity. She notes that pain worsens with certain movements.  Pain radiates down her left leg but she denies weakness or numbness to the leg.    Patient also presents to the ED feelings of fatigue for the past month. She thinks it may be due to her anxiety. Patient notes that she has been under stress lately. She notes that she had an anxiety attack a month ago and was prescribed a muscle relaxer.  She denies the muscle relaxer alleviating any symptoms.  She endorses associated vomiting. She denies any diarrhea.               Review of patient's allergies indicates:   Allergen Reactions    Percocet [oxycodone-acetaminophen]      Past Medical History:   Diagnosis Date    Irregular heart rate     Ovarian cyst      Past Surgical History:   Procedure Laterality Date    DILATION AND CURETTAGE OF UTERUS      PARTIAL HYSTERECTOMY      TUBAL LIGATION       History reviewed. No pertinent family history.  Social History   Substance Use Topics    Smoking status: Current Every Day Smoker    Smokeless tobacco: Never Used    Alcohol use No     Review of Systems   Constitutional: Positive for fatigue. Negative for chills.   HENT: Positive for nosebleeds. Negative for " congestion, postnasal drip, rhinorrhea and sore throat.    Respiratory: Negative for cough.    Cardiovascular: Negative for chest pain.   Gastrointestinal: Positive for nausea and vomiting. Negative for diarrhea.   Genitourinary: Negative for dysuria and hematuria.   Musculoskeletal: Positive for back pain.   Neurological: Negative for weakness and numbness.   Psychiatric/Behavioral: The patient is nervous/anxious.        Physical Exam     Initial Vitals [07/25/18 1344]   BP Pulse Resp Temp SpO2   (!) 139/97 76 16 98 °F (36.7 °C) 100 %      MAP       --         Physical Exam    Nursing note and vitals reviewed.  Constitutional: She appears well-developed and well-nourished.   HENT:   Head: Normocephalic and atraumatic.   Mouth/Throat: Oropharynx is clear and moist.   Eyes: Conjunctivae are normal.   Neck: Normal range of motion. Neck supple.   Cardiovascular: Intact distal pulses.   Pulmonary/Chest: No respiratory distress. She has no wheezes.   Abdominal: She exhibits no distension.   Musculoskeletal:        Back:    Neurological: She is alert and oriented to person, place, and time. She has normal strength. No sensory deficit.   Negative Straight Leg Test.      Skin: Capillary refill takes less than 2 seconds.   Psychiatric: She has a normal mood and affect. Her behavior is normal.         ED Course   Procedures  Labs Reviewed   POCT URINALYSIS W/O SCOPE - Abnormal; Notable for the following:        Result Value    Glucose, UA Negative (*)     Bilirubin, UA Negative (*)     Ketones, UA 3+ (*)     Spec Grav UA >=1.030 (*)     Blood, UA 2+ (*)     Protein, UA 1+ (*)     Nitrite, UA Negative (*)     Leukocytes, UA Negative (*)     All other components within normal limits   POCT URINALYSIS W/O SCOPE          Imaging Results    None          Medical Decision Making:   Initial Assessment:   The patient is a 33 y.o. female with co-morbidities including: anxiety, back problems,  who presents to the ED with a complaint of  constant left back pain that radiates to her thigh for 3 weeks. She denies any dysuria or hematuria.      Patient also presents to the ED feelings of fatigue for the past month.      Her exam is significant for tenderness along her left back and buttock area.  and a negative straight leg raise test.   Clinical Tests:   Lab Tests: Ordered and Reviewed  ED Management:  Urinalysis had 2+ ketones.  Therefore,  I will order an IV of sodium chloride for symptomatic relief. I will order a CBC, CMP  Patient's lab showed no significant abnormalities.  She felt better after the medications provided.            Scribe Attestation:   Scribe #1: I performed the above scribed service and the documentation accurately describes the services I performed. I attest to the accuracy of the note.              I, Dr. Priya Denney, personally performed the services described in this documentation. All medical record entries made by the scribe were at my direction and in my presence.  I have reviewed the chart and agree that the record reflects my personal performance and is accurate and complete. Priya Denney MD.  5:38 PM 07/25/2018       Clinical Impression:     1. Acute left-sided low back pain with left-sided sciatica    2. Fatigue, unspecified type    3. Anxiety                                   Priya Denney MD  07/25/18 3858

## 2019-02-03 ENCOUNTER — HOSPITAL ENCOUNTER (EMERGENCY)
Facility: HOSPITAL | Age: 34
Discharge: HOME | End: 2019-02-03
Attending: INTERNAL MEDICINE
Payer: MEDICAID

## 2019-02-03 VITALS
WEIGHT: 110 LBS | SYSTOLIC BLOOD PRESSURE: 123 MMHG | DIASTOLIC BLOOD PRESSURE: 74 MMHG | TEMPERATURE: 98 F | RESPIRATION RATE: 19 BRPM | OXYGEN SATURATION: 98 % | HEIGHT: 62 IN | HEART RATE: 72 BPM | BODY MASS INDEX: 20.24 KG/M2

## 2019-02-03 DIAGNOSIS — K59.00 CONSTIPATION, UNSPECIFIED CONSTIPATION TYPE: Primary | ICD-10-CM

## 2019-02-03 LAB
ALBUMIN SERPL-MCNC: 4.1 G/DL (ref 3.3–5.5)
ALP SERPL-CCNC: 64 U/L (ref 42–141)
BILIRUB SERPL-MCNC: 0.6 MG/DL (ref 0.2–1.6)
BILIRUBIN, POC UA: NEGATIVE
BLOOD, POC UA: ABNORMAL
BUN SERPL-MCNC: 9 MG/DL (ref 7–22)
CALCIUM SERPL-MCNC: 9.3 MG/DL (ref 8–10.3)
CHLORIDE SERPL-SCNC: 103 MMOL/L (ref 98–108)
CLARITY, POC UA: CLEAR
COLOR, POC UA: YELLOW
CREAT SERPL-MCNC: 0.7 MG/DL (ref 0.6–1.2)
GLUCOSE SERPL-MCNC: 87 MG/DL (ref 73–118)
GLUCOSE, POC UA: NEGATIVE
KETONES, POC UA: NEGATIVE
LEUKOCYTE EST, POC UA: NEGATIVE
NITRITE, POC UA: NEGATIVE
PH UR STRIP: 6.5 [PH]
POC ALT (SGPT): 13 U/L (ref 10–47)
POC AST (SGOT): 23 U/L (ref 11–38)
POC TCO2: 26 MMOL/L (ref 18–33)
POTASSIUM BLD-SCNC: 3.7 MMOL/L (ref 3.6–5.1)
PROTEIN, POC UA: NEGATIVE
PROTEIN, POC: 6.6 G/DL (ref 6.4–8.1)
SODIUM BLD-SCNC: 139 MMOL/L (ref 128–145)
SPECIFIC GRAVITY, POC UA: 1.01
UROBILINOGEN, POC UA: 0.2 E.U./DL

## 2019-02-03 PROCEDURE — 81003 URINALYSIS AUTO W/O SCOPE: CPT | Mod: ER

## 2019-02-03 PROCEDURE — 63600175 PHARM REV CODE 636 W HCPCS: Mod: ER | Performed by: INTERNAL MEDICINE

## 2019-02-03 PROCEDURE — 99284 EMERGENCY DEPT VISIT MOD MDM: CPT | Mod: 25,ER

## 2019-02-03 PROCEDURE — 85025 COMPLETE CBC W/AUTO DIFF WBC: CPT | Mod: ER

## 2019-02-03 PROCEDURE — 80053 COMPREHEN METABOLIC PANEL: CPT | Mod: ER

## 2019-02-03 PROCEDURE — 96372 THER/PROPH/DIAG INJ SC/IM: CPT | Mod: ER

## 2019-02-03 RX ORDER — POLYETHYLENE GLYCOL 3350 17 G/17G
17 POWDER, FOR SOLUTION ORAL DAILY
Qty: 17 G | Refills: 0 | Status: SHIPPED | OUTPATIENT
Start: 2019-02-03

## 2019-02-03 RX ORDER — KETOROLAC TROMETHAMINE 30 MG/ML
60 INJECTION, SOLUTION INTRAMUSCULAR; INTRAVENOUS
Status: COMPLETED | OUTPATIENT
Start: 2019-02-03 | End: 2019-02-03

## 2019-02-03 RX ADMIN — KETOROLAC TROMETHAMINE 60 MG: 30 INJECTION, SOLUTION INTRAMUSCULAR at 02:02

## 2019-02-03 NOTE — ED PROVIDER NOTES
Encounter Date: 2/3/2019    SCRIBE #1 NOTE: I, Laurie Mueller, am scribing for, and in the presence of,  Dr. Montes De Oca. I have scribed the following portions of the note - Other sections scribed: HPI, ROS, PE.       History     Chief Complaint   Patient presents with    Flank Pain     right flank and lower back/abdominal pain, increasing x 1 week, hx of ovarian cyst and pelvic mesh s/p hysterectomy in 2008     Jordana Davis is a 34 y.o. female with history of partial hysterectomy and ovarian cysts who presents to the ED complaining of 8/10 right back/side and right lower abdominal pain for one week. Pain with intercourse last week. She took Ibuprofen 1.5 hours ago today without relief.      The history is provided by the patient. No  was used.     Review of patient's allergies indicates:   Allergen Reactions    Percocet [oxycodone-acetaminophen]      Past Medical History:   Diagnosis Date    Irregular heart rate     Ovarian cyst      Past Surgical History:   Procedure Laterality Date    DILATION AND CURETTAGE OF UTERUS      PARTIAL HYSTERECTOMY      TUBAL LIGATION       History reviewed. No pertinent family history.  Social History     Tobacco Use    Smoking status: Current Every Day Smoker    Smokeless tobacco: Never Used   Substance Use Topics    Alcohol use: No    Drug use: No     Review of Systems   Constitutional: Negative for fever.   HENT: Negative for sore throat.    Respiratory: Negative for shortness of breath.    Cardiovascular: Negative for chest pain.   Gastrointestinal: Positive for abdominal pain. Negative for nausea and vomiting.   Genitourinary: Positive for flank pain. Negative for dysuria.   Musculoskeletal: Positive for back pain.   Skin: Negative for rash.   Neurological: Negative for weakness.   Hematological: Negative for adenopathy.   Psychiatric/Behavioral: Negative for behavioral problems.   All other systems reviewed and are negative.      Physical Exam      Initial Vitals [02/03/19 1414]   BP Pulse Resp Temp SpO2   137/85 89 19 98.1 °F (36.7 °C) 100 %      MAP       --         Physical Exam    Nursing note and vitals reviewed.  Constitutional: She appears well-developed and well-nourished.   HENT:   Head: Normocephalic and atraumatic.   Right Ear: External ear normal.   Left Ear: External ear normal.   Nose: Nose normal.   Mouth/Throat: Oropharynx is clear and moist.   Eyes: Conjunctivae and EOM are normal. Pupils are equal, round, and reactive to light.   Neck: Normal range of motion and phonation normal. Neck supple.   Cardiovascular: Normal rate, regular rhythm, normal heart sounds and intact distal pulses. Exam reveals no gallop and no friction rub.    No murmur heard.  Pulmonary/Chest: Effort normal and breath sounds normal. No stridor. No respiratory distress. She has no wheezes. She has no rhonchi. She has no rales. She exhibits no tenderness.   Abdominal: Soft. Bowel sounds are normal. She exhibits no distension. There is tenderness in the right lower quadrant. There is no rigidity, no rebound and no guarding.   Musculoskeletal: Normal range of motion. She exhibits no edema or tenderness.   Neurological: She is alert and oriented to person, place, and time. She has normal strength. No cranial nerve deficit or sensory deficit. GCS score is 15. GCS eye subscore is 4. GCS verbal subscore is 5. GCS motor subscore is 6.   Skin: Skin is warm and dry. Capillary refill takes less than 2 seconds. No rash noted.   Psychiatric: She has a normal mood and affect. Her behavior is normal.         ED Course   Procedures  Labs Reviewed   POCT URINALYSIS W/O SCOPE - Abnormal; Notable for the following components:       Result Value    Glucose, UA Negative (*)     Bilirubin, UA Negative (*)     Ketones, UA Negative (*)     Blood, UA Trace-lysed (*)     Protein, UA Negative (*)     Nitrite, UA Negative (*)     Leukocytes, UA Negative (*)     All other components within normal  limits   POCT URINALYSIS W/O SCOPE   POCT CBC   POCT CMP   POCT CMP          Imaging Results          CT Renal Stone Study ABD Pelvis WO (Final result)  Result time 02/03/19 15:06:50    Final result by Milka Kincaid MD (02/03/19 15:06:50)                 Impression:      No nephrolithiasis, ureterolithiasis, hydronephrosis or hydroureter.    Constipation.    Normal appendix.    Left adnexal cyst.      Electronically signed by: Milka Kincaid MD  Date:    02/03/2019  Time:    15:06             Narrative:    EXAMINATION:  CT RENAL STONE STUDY ABD PELVIS WO    CLINICAL HISTORY:  Flank pain, stone disease suspected;Hematuria;    TECHNIQUE:  Low dose axial images, sagittal and coronal reformations were obtained from the lung bases to the pubic symphysis.  Contrast was not administered.    COMPARISON:  07/06/2017    FINDINGS:  Lung bases are clear.  Base of the heart appears normal.  Aorta is of normal caliber and tapers appropriately.    No radiopaque gallstones are seen.  No intrahepatic or extrahepatic biliary ductal dilatation is identified.  The unenhanced liver, spleen, pancreas and adrenal glands appear normal.  Both kidneys are normal in size, shape and contour.  No nephrolithiasis, ureterolithiasis, hydronephrosis or hydroureter is seen.  The urinary bladder is well distended and within normal limits.  Suspected left adnexal cyst.  The uterus has been removed.    Mild colonic stool retention.  The appendix is normal.  No free air, free fluid or obstruction is identified.  No pathologically enlarged abdominal or pelvic lymph nodes are seen.    The skeletal structures are intact.                                 Medical Decision Making:   History:   Old Medical Records: I decided to obtain old medical records.  Initial Assessment:   34 y.o. female with history of partial hysterectomy and ovarian cysts who presents to the ED complaining of 8/10 right back/side and right lower abdominal pain for one week.    Clinical Tests:   Lab Tests: Ordered and Reviewed  Radiological Study: Ordered and Reviewed  ED Management:  CBC, CMP and CT of the abdomen were within normal limits except for evidence of constipation.  Patient was given instructions for constipation and a prescription for MiraLax.  She was advised to follow up with her primary care physician within the next 2 days for re-evaluation and to return to the emergency department if condition worsens.            Scribe Attestation:   Scribe #1: I performed the above scribed service and the documentation accurately describes the services I performed. I attest to the accuracy of the note.    This document was produced by a scribe under my direction and in my presence. I agree with the content of the note and have made any necessary edits.     Dr. Montes De Oca    02/03/2019 4:00 PM             Clinical Impression:     1. Constipation, unspecified constipation type            Disposition:   Disposition: Discharged  Condition: Stable                        Hal Montes De Oca MD  02/03/19 1600

## 2019-03-13 ENCOUNTER — HOSPITAL ENCOUNTER (EMERGENCY)
Facility: HOSPITAL | Age: 34
Discharge: HOME | End: 2019-03-13
Attending: EMERGENCY MEDICINE
Payer: MEDICAID

## 2019-03-13 VITALS
RESPIRATION RATE: 14 BRPM | OXYGEN SATURATION: 100 % | HEIGHT: 62 IN | BODY MASS INDEX: 19.14 KG/M2 | SYSTOLIC BLOOD PRESSURE: 131 MMHG | HEART RATE: 82 BPM | WEIGHT: 104 LBS | DIASTOLIC BLOOD PRESSURE: 79 MMHG | TEMPERATURE: 98 F

## 2019-03-13 DIAGNOSIS — R07.9 CHEST PAIN: ICD-10-CM

## 2019-03-13 DIAGNOSIS — F41.1 GENERALIZED ANXIETY DISORDER: Primary | ICD-10-CM

## 2019-03-13 LAB
ALBUMIN SERPL-MCNC: 4.9 G/DL
ALP SERPL-CCNC: 53 U/L
B-HCG UR QL: NEGATIVE
BILIRUB SERPL-MCNC: 0.9 MG/DL
BUN SERPL-MCNC: 11 MG/DL
CALCIUM SERPL-MCNC: 9.2 MG/DL
CHLORIDE SERPL-SCNC: 105 MMOL/L
CREAT SERPL-MCNC: 0.3 MG/DL
CTP QC/QA: YES
GLUCOSE SERPL-MCNC: 85 MG/DL (ref 70–110)
POC ALT (SGPT): 14 U/L
POC AST (SGOT): 26 U/L
POC B-TYPE NATRIURETIC PEPTIDE: 10.3 PG/ML
POC CARDIAC TROPONIN I: 0 NG/ML
POC TCO2: 27 MMOL/L
POTASSIUM BLD-SCNC: 3.8 MMOL/L
PROTEIN, POC: 7.2 G/DL
SAMPLE: NORMAL
SODIUM BLD-SCNC: 142 MMOL/L

## 2019-03-13 PROCEDURE — 84484 ASSAY OF TROPONIN QUANT: CPT | Mod: ER

## 2019-03-13 PROCEDURE — 81025 URINE PREGNANCY TEST: CPT | Mod: ER | Performed by: NURSE PRACTITIONER

## 2019-03-13 PROCEDURE — 99284 EMERGENCY DEPT VISIT MOD MDM: CPT | Mod: 25,ER

## 2019-03-13 PROCEDURE — 93010 EKG 12-LEAD: ICD-10-PCS | Mod: ,,, | Performed by: INTERNAL MEDICINE

## 2019-03-13 PROCEDURE — 80053 COMPREHEN METABOLIC PANEL: CPT | Mod: ER

## 2019-03-13 PROCEDURE — 85025 COMPLETE CBC W/AUTO DIFF WBC: CPT | Mod: ER

## 2019-03-13 PROCEDURE — 93010 ELECTROCARDIOGRAM REPORT: CPT | Mod: ,,, | Performed by: INTERNAL MEDICINE

## 2019-03-13 PROCEDURE — 93005 ELECTROCARDIOGRAM TRACING: CPT | Mod: ER

## 2019-03-13 PROCEDURE — 83880 ASSAY OF NATRIURETIC PEPTIDE: CPT | Mod: ER

## 2019-03-13 RX ORDER — BUSPIRONE HYDROCHLORIDE 7.5 MG/1
7.5 TABLET ORAL 3 TIMES DAILY
Qty: 90 TABLET | Refills: 1 | Status: SHIPPED | OUTPATIENT
Start: 2019-03-13

## 2019-03-13 NOTE — ED TRIAGE NOTES
Pt to proom c/o chest pain x 2 days  Radiating to right arm . Pt states I have a hx of anxiety but I haven't been able to get into a general Dr to get treated . Pt states I have been under a lot of stress lately .

## 2019-03-13 NOTE — ED PROVIDER NOTES
Encounter Date: 3/13/2019    SCRIBE #1 NOTE: I, Alexander Andres, am scribing for, and in the presence of,  Toussaint Battley, FNP. I have scribed the following portions of the note - Other sections scribed: HPI, ROS, PE.       History     Chief Complaint   Patient presents with    Chest Pain     This is a 34 y.o.  female who presents to the ED with a chief complaint of CP that began two days ago.  Nothing worsens or relieves her pain. Denies taking pain medication.  She rates her pain a three out of ten in severity.  Pt's PMHx includes anxiety but does not take medication for this problem.  She has had trouble scheduling an appointment with a psychiatrist.  Pt endorses HA.  Denies fever, vision changes, chills, coughing, SOB, nausea, emesis, abdominal pain, dysuria, hematuria, back pain, lightheadedness, weakness, or numbness.        The history is provided by the patient. No  was used.   Chest Pain   The current episode started two days ago. The chest pain is unchanged. Associated with: Anxiety. The chest pain is currently at 3/10. Chest pain is worsened by stress. Pertinent negatives for primary symptoms include no fever, no shortness of breath, no cough, no abdominal pain, no nausea and no vomiting.   Pertinent negatives for associated symptoms include no numbness and no weakness. She tried nothing for the symptoms.     Review of patient's allergies indicates:   Allergen Reactions    Percocet [oxycodone-acetaminophen]      Past Medical History:   Diagnosis Date    Irregular heart rate     Ovarian cyst      Past Surgical History:   Procedure Laterality Date    DILATION AND CURETTAGE OF UTERUS      PARTIAL HYSTERECTOMY      TUBAL LIGATION       History reviewed. No pertinent family history.  Social History     Tobacco Use    Smoking status: Current Every Day Smoker    Smokeless tobacco: Never Used   Substance Use Topics    Alcohol use: No    Drug use: No     Review of Systems    Constitutional: Negative for chills and fever.   Eyes: Negative for visual disturbance.   Respiratory: Negative for cough and shortness of breath.    Cardiovascular: Positive for chest pain. Negative for leg swelling.   Gastrointestinal: Negative for abdominal pain, nausea and vomiting.   Genitourinary: Negative for dysuria and hematuria.   Musculoskeletal: Negative for back pain.   Neurological: Positive for headaches. Negative for weakness, light-headedness and numbness.   Psychiatric/Behavioral: The patient is nervous/anxious.    All other systems reviewed and are negative.      Physical Exam     Initial Vitals [03/13/19 1145]   BP Pulse Resp Temp SpO2   (!) 147/89 79 17 97.8 °F (36.6 °C) 100 %      MAP       --         Physical Exam    Nursing note and vitals reviewed.  Constitutional: She appears well-developed and well-nourished.   HENT:   Head: Normocephalic and atraumatic.   Eyes: Conjunctivae are normal.   Neck: Normal range of motion. Neck supple.   Cardiovascular: Normal rate, regular rhythm, normal heart sounds and intact distal pulses. Exam reveals no gallop and no friction rub.    No murmur heard.  Pulses:       Radial pulses are 2+ on the right side, and 2+ on the left side.   Pulmonary/Chest: Effort normal and breath sounds normal. No respiratory distress. She has no wheezes. She has no rhonchi. She has no rales. She exhibits no tenderness.   Abdominal: Soft. Bowel sounds are normal. She exhibits no distension and no mass. There is no tenderness. There is no rebound and no guarding.   Musculoskeletal: Normal range of motion.   Neurological: She is alert and oriented to person, place, and time.   Skin: Skin is warm and dry. Capillary refill takes less than 2 seconds.   Psychiatric: Her mood appears anxious (ECHO 7 Score of 13).         ED Course   Procedures  Labs Reviewed   TROPONIN ISTAT   POCT URINE PREGNANCY   POCT CBC   POCT TROPONIN   POCT CMP   POCT B-TYPE NATRIURETIC PEPTIDE (BNP)   POCT CMP    POCT B-TYPE NATRIURETIC PEPTIDE (BNP)     EKG Readings: (Independently Interpreted)   Initial Reading: No STEMI. Rhythm: Normal Sinus Rhythm. Heart Rate: 87. Ectopy: No Ectopy. Conduction: Normal. ST Segments: Normal ST Segments. T Waves: Normal. Axis: Normal. Clinical Impression: Normal Sinus Rhythm       Imaging Results          X-Ray Chest PA And Lateral (Final result)  Result time 03/13/19 12:29:34    Final result by Filiberto Bustos MD (03/13/19 12:29:34)                 Impression:      Normal radiographic appearance of the chest.      Electronically signed by: Filiberto Bustos MD  Date:    03/13/2019  Time:    12:29             Narrative:    EXAMINATION:  XR CHEST PA AND LATERAL    CLINICAL HISTORY:  Chest pain, unspecified    TECHNIQUE:  PA and lateral views of the chest were performed.    COMPARISON:  CT renal stone study 02/03/2019    FINDINGS:  The lungs are clear, with normal appearance of pulmonary vasculature and no pleural effusion or pneumothorax.    The cardiac silhouette is normal in size. The hilar and mediastinal contours are unremarkable.    Bones are intact.                                 Medical Decision Making:   Initial Assessment:   Generalized anxiety disorder  Differential Diagnosis:   Cardiac abnormality, abnormal chest x-ray  Clinical Tests:   Lab Tests: Ordered  Radiological Study: Ordered  Medical Tests: Ordered  ED Management:  Patient will be discharged home on [place] with instructions to follow up with her primary care provider for referral to a mental health specialist.  Patient is also instructed to return to the ER as needed if symptoms worsen or fail to improve.  Patient verbalized understanding of discharge instruction treatment plan.            Scribe Attestation:   Scribe #1: I performed the above scribed service and the documentation accurately describes the services I performed. I attest to the accuracy of the note.               Clinical Impression:     1. Chest pain                                   Toussaint Battley III, [facility]  03/13/19 1257

## 2019-05-30 ENCOUNTER — HOSPITAL ENCOUNTER (EMERGENCY)
Facility: HOSPITAL | Age: 34
Discharge: HOME | End: 2019-05-30
Attending: EMERGENCY MEDICINE
Payer: MEDICAID

## 2019-05-30 VITALS
WEIGHT: 105 LBS | HEART RATE: 84 BPM | RESPIRATION RATE: 18 BRPM | TEMPERATURE: 98 F | OXYGEN SATURATION: 100 % | BODY MASS INDEX: 19.2 KG/M2 | DIASTOLIC BLOOD PRESSURE: 78 MMHG | SYSTOLIC BLOOD PRESSURE: 120 MMHG

## 2019-05-30 DIAGNOSIS — K05.10 SUPERFICIAL INJURY OF GINGIVA WITH INFECTION, INITIAL ENCOUNTER: Primary | ICD-10-CM

## 2019-05-30 DIAGNOSIS — K04.01 ACUTE PULPITIS: ICD-10-CM

## 2019-05-30 DIAGNOSIS — S00.502A SUPERFICIAL INJURY OF GINGIVA WITH INFECTION, INITIAL ENCOUNTER: Primary | ICD-10-CM

## 2019-05-30 DIAGNOSIS — R11.0 NAUSEA: ICD-10-CM

## 2019-05-30 LAB
B-HCG UR QL: NEGATIVE
BILIRUBIN, POC UA: NEGATIVE
BLOOD, POC UA: ABNORMAL
CLARITY, POC UA: ABNORMAL
COLOR, POC UA: YELLOW
CTP QC/QA: YES
GLUCOSE, POC UA: NEGATIVE
KETONES, POC UA: NEGATIVE
LEUKOCYTE EST, POC UA: NEGATIVE
NITRITE, POC UA: NEGATIVE
PH UR STRIP: 7 [PH]
PROTEIN, POC UA: NEGATIVE
SPECIFIC GRAVITY, POC UA: 1.02
UROBILINOGEN, POC UA: 1 E.U./DL

## 2019-05-30 PROCEDURE — 63600175 PHARM REV CODE 636 W HCPCS: Mod: ER | Performed by: NURSE PRACTITIONER

## 2019-05-30 PROCEDURE — 96372 THER/PROPH/DIAG INJ SC/IM: CPT | Mod: ER

## 2019-05-30 PROCEDURE — 81025 URINE PREGNANCY TEST: CPT | Mod: ER | Performed by: NURSE PRACTITIONER

## 2019-05-30 PROCEDURE — 25000003 PHARM REV CODE 250: Mod: ER | Performed by: NURSE PRACTITIONER

## 2019-05-30 PROCEDURE — 99284 EMERGENCY DEPT VISIT MOD MDM: CPT | Mod: 25,ER

## 2019-05-30 PROCEDURE — 81003 URINALYSIS AUTO W/O SCOPE: CPT | Mod: ER

## 2019-05-30 RX ORDER — CHLORHEXIDINE GLUCONATE ORAL RINSE 1.2 MG/ML
15 SOLUTION DENTAL 2 TIMES DAILY
Qty: 473 ML | Refills: 0 | Status: SHIPPED | OUTPATIENT
Start: 2019-05-30 | End: 2019-06-13

## 2019-05-30 RX ORDER — ONDANSETRON 4 MG/1
4 TABLET, ORALLY DISINTEGRATING ORAL
Status: COMPLETED | OUTPATIENT
Start: 2019-05-30 | End: 2019-05-30

## 2019-05-30 RX ORDER — KETOROLAC TROMETHAMINE 30 MG/ML
15 INJECTION, SOLUTION INTRAMUSCULAR; INTRAVENOUS
Status: COMPLETED | OUTPATIENT
Start: 2019-05-30 | End: 2019-05-30

## 2019-05-30 RX ORDER — ONDANSETRON 4 MG/1
4 TABLET, FILM COATED ORAL EVERY 6 HOURS
Qty: 12 TABLET | Refills: 0 | Status: SHIPPED | OUTPATIENT
Start: 2019-05-30

## 2019-05-30 RX ORDER — AMOXICILLIN 875 MG/1
875 TABLET, FILM COATED ORAL 2 TIMES DAILY
Qty: 14 TABLET | Refills: 0 | OUTPATIENT
Start: 2019-05-30 | End: 2019-09-08

## 2019-05-30 RX ORDER — ETODOLAC 300 MG/1
300 CAPSULE ORAL 2 TIMES DAILY
Qty: 30 CAPSULE | Refills: 0 | OUTPATIENT
Start: 2019-05-30 | End: 2019-09-08

## 2019-05-30 RX ADMIN — ONDANSETRON 4 MG: 4 TABLET, ORALLY DISINTEGRATING ORAL at 11:05

## 2019-05-30 RX ADMIN — KETOROLAC TROMETHAMINE 15 MG: 30 INJECTION, SOLUTION INTRAMUSCULAR; INTRAVENOUS at 11:05

## 2019-05-30 NOTE — ED PROVIDER NOTES
"Encounter Date: 5/30/2019       History     Chief Complaint   Patient presents with    Dental Pain     Pt states," Tooth pain for two days."     The patient is a 34-year-old female with no pertinent past medical history who presents to the ED complaining of tooth and jaw pain since yesterday.  The patient is afebrile, non-toxic appearing, and hemodynamically stable in triage.  She endorses nausea and malaise.  No vomiting or fevers.  Denies urinary symptoms. She feels like she may have had a tooth abscess because she heard it pop and felt sudden drainage from her gums.  Attempted to get a dental appointment, but the office was closed.  She has been taking Tylenol and Motrin without relief of symptoms.    The history is provided by the patient.     Review of patient's allergies indicates:   Allergen Reactions    Percocet [oxycodone-acetaminophen]      Past Medical History:   Diagnosis Date    Irregular heart rate     Ovarian cyst      Past Surgical History:   Procedure Laterality Date    DILATION AND CURETTAGE OF UTERUS      PARTIAL HYSTERECTOMY      TUBAL LIGATION       History reviewed. No pertinent family history.  Social History     Tobacco Use    Smoking status: Current Every Day Smoker    Smokeless tobacco: Never Used   Substance Use Topics    Alcohol use: No    Drug use: No     Review of Systems   Constitutional: Negative for chills and fever.   HENT: Positive for dental problem. Negative for sore throat.    Respiratory: Negative for shortness of breath.    Cardiovascular: Negative for chest pain.   Gastrointestinal: Positive for nausea. Negative for abdominal pain and vomiting.   Genitourinary: Negative for dysuria.   Musculoskeletal: Negative for back pain.   Skin: Negative for rash.   Neurological: Negative for dizziness and weakness.   Hematological: Does not bruise/bleed easily.       Physical Exam     Initial Vitals [05/30/19 1025]   BP Pulse Resp Temp SpO2   123/82 92 16 98 °F (36.7 °C) 100 " %      MAP       --         Physical Exam    Nursing note and vitals reviewed.  Constitutional: She appears well-developed and well-nourished.  Non-toxic appearance. No distress.   The patient is alert, oriented, and speaking in complete sentences.  Phonation is normal.  She is tolerating oral secretions without difficulty.   HENT:   Head: Normocephalic and atraumatic.   Right Ear: Hearing, tympanic membrane, external ear and ear canal normal.   Left Ear: Hearing, tympanic membrane, external ear and ear canal normal.   Nose: Nose normal.   Mouth/Throat: Oropharynx is clear and moist and mucous membranes are normal. No trismus in the jaw. Abnormal dentition. No uvula swelling.       Localized tooth pain that is tender to palpation as indicated.  Mild erythema noted to surrounding gingiva.  No bleeding, fluctuance, or drainage at this time.  There is a small laceration noted to the adjacent gingiva.  No sublingual edema. No oropharyngeal edema or erythema.   Eyes: Conjunctivae and EOM are normal.   Neck: Full passive range of motion without pain. Neck supple.   No meningismus   Cardiovascular: Normal rate, regular rhythm, normal heart sounds and normal pulses. Exam reveals no gallop and no friction rub.    No murmur heard.  Pulmonary/Chest: Effort normal and breath sounds normal. No respiratory distress. She has no wheezes. She has no rhonchi. She has no rales.   Musculoskeletal: Normal range of motion.   Neurological: She is alert and oriented to person, place, and time. She has normal strength. Gait normal.   Skin: Skin is warm, dry and intact. Capillary refill takes less than 2 seconds. No rash noted.   Psychiatric: She has a normal mood and affect. Her speech is normal and behavior is normal. Judgment and thought content normal. Cognition and memory are normal.         ED Course   Procedures  Labs Reviewed   POCT URINE PREGNANCY   POCT URINALYSIS W/O SCOPE          Imaging Results    None          Medical Decision  Making:   History:   Old Medical Records: I decided to obtain old medical records.  Clinical Tests:   Lab Tests: Ordered and Reviewed  ED Management:  After complete evaluation, including thorough history and physical exam, the patient's symptoms are most consistent with benign dentalgia due to local infection. Physical exam is benign, without significant facial, tongue, or neck swelling to suggest cellulitis, abscess, or Jonathon's angina. The patient is tolerating secretions without drooling, dysphagia, or voice changes to suggest deep space neck infection. There is no intraoral or gingival fluctuance to suggest abscess requiring incision/drainage at this time.  UPT negative.  UA does not demonstrate evidence of UTI.  Nausea and malaise are likely related to dental pain.  I will cover with PO amoxicillin, as well as Lodine, Peridex, and Magic mouthwash for symptomatic relief.   Patient was instructed to follow-up with a dentist as soon as possible for further evaluation and tooth extraction if needed.  [facility] was included with her discharge instructions.  All questions answered.  Return precautions given.                      Clinical Impression:       ICD-10-CM ICD-9-CM   1. Superficial injury of gingiva with infection, initial encounter S00.502A 910.9    K05.10    2. Nausea R11.0 787.02   3. Acute pulpitis K04.01 522.0                                Soraya Robles NP  05/30/19 1220

## 2019-09-08 ENCOUNTER — HOSPITAL ENCOUNTER (EMERGENCY)
Facility: HOSPITAL | Age: 34
Discharge: HOME | End: 2019-09-08
Attending: EMERGENCY MEDICINE
Payer: MEDICAID

## 2019-09-08 VITALS
SYSTOLIC BLOOD PRESSURE: 129 MMHG | HEIGHT: 62 IN | DIASTOLIC BLOOD PRESSURE: 79 MMHG | HEART RATE: 88 BPM | WEIGHT: 108 LBS | TEMPERATURE: 99 F | BODY MASS INDEX: 19.88 KG/M2 | OXYGEN SATURATION: 98 % | RESPIRATION RATE: 16 BRPM

## 2019-09-08 DIAGNOSIS — R51.9 SINUS HEADACHE: Primary | ICD-10-CM

## 2019-09-08 LAB
BILIRUBIN, POC UA: NEGATIVE
BLOOD, POC UA: ABNORMAL
CLARITY, POC UA: CLEAR
COLOR, POC UA: YELLOW
GLUCOSE, POC UA: ABNORMAL
KETONES, POC UA: NEGATIVE
LEUKOCYTE EST, POC UA: NEGATIVE
NITRITE, POC UA: NEGATIVE
PH UR STRIP: 7 [PH]
POCT GLUCOSE: 87 MG/DL (ref 70–110)
PROTEIN, POC UA: NEGATIVE
SPECIFIC GRAVITY, POC UA: 1.02
UROBILINOGEN, POC UA: 1 E.U./DL

## 2019-09-08 PROCEDURE — 81003 URINALYSIS AUTO W/O SCOPE: CPT | Mod: ER

## 2019-09-08 PROCEDURE — 96372 THER/PROPH/DIAG INJ SC/IM: CPT | Mod: ER

## 2019-09-08 PROCEDURE — 63600175 PHARM REV CODE 636 W HCPCS: Mod: ER | Performed by: EMERGENCY MEDICINE

## 2019-09-08 PROCEDURE — 99284 EMERGENCY DEPT VISIT MOD MDM: CPT | Mod: 25,ER

## 2019-09-08 RX ORDER — KETOROLAC TROMETHAMINE 30 MG/ML
30 INJECTION, SOLUTION INTRAMUSCULAR; INTRAVENOUS
Status: COMPLETED | OUTPATIENT
Start: 2019-09-08 | End: 2019-09-08

## 2019-09-08 RX ORDER — FLUTICASONE PROPIONATE 50 MCG
2 SPRAY, SUSPENSION (ML) NASAL DAILY
Qty: 16 G | Refills: 0 | OUTPATIENT
Start: 2019-09-08 | End: 2020-02-29

## 2019-09-08 RX ORDER — METHYLPREDNISOLONE 4 MG/1
TABLET ORAL
Qty: 1 PACKAGE | Refills: 0 | Status: SHIPPED | OUTPATIENT
Start: 2019-09-08 | End: 2019-09-29

## 2019-09-08 RX ORDER — MONTELUKAST SODIUM 10 MG/1
10 TABLET ORAL NIGHTLY
Qty: 30 TABLET | Refills: 0 | Status: SHIPPED | OUTPATIENT
Start: 2019-09-08 | End: 2019-10-08

## 2019-09-08 RX ORDER — LORATADINE 10 MG/1
10 TABLET ORAL DAILY
Qty: 60 TABLET | Refills: 0 | COMMUNITY
Start: 2019-09-08 | End: 2020-06-29

## 2019-09-08 RX ORDER — NAPROXEN 500 MG/1
500 TABLET ORAL 2 TIMES DAILY WITH MEALS
Qty: 30 TABLET | Refills: 0 | OUTPATIENT
Start: 2019-09-08 | End: 2022-01-02

## 2019-09-08 RX ADMIN — KETOROLAC TROMETHAMINE 30 MG: 30 INJECTION, SOLUTION INTRAMUSCULAR; INTRAVENOUS at 08:09

## 2019-09-09 NOTE — ED PROVIDER NOTES
"Encounter Date: 9/8/2019    SCRIBE #1 NOTE: I, Nguyen Gardiner, am scribing for, and in the presence of,  Dr. Frances. I have scribed the following portions of the note - Other sections scribed: HPI, ROS, PE.       History     Chief Complaint   Patient presents with    Headache     pt reports 3 days ago she started having N/V, 1 epidsode of diarrhea. She reports the N/V resolved but she has a headache and chills, feels fatigued     Fatigue     34 year old female presents to ED c/o acute, intermittent headache, fatigue and lightheadedness that began earlier today.  She reports nausea and vomiting 3 days ago which has since resolved.  Since that time patient reports eating and drinking less.  Patient states she has been taking Zofran for nausea.  She also reports subjective fever, palpitations and lightheadedness but denies neck stiffness, photophobia, vision disturbance, gait disturbance, URI symptoms, abdominal pain, diarrhea, dysuria or syncope.  She states headache was 6/10 in severity at its worst but is currently resolved.      Patient drinks alcohol and caffeine (more than 2 cups a day) everyday, but smokes marijuana occasionally. Patient had a hysterectomy.    Patient has a history of an irregular heart rate and wore a 30 day heart monitor.  Reports being told that she had "low blood sugar" in the past.                 The history is provided by the patient. No  was used.   Fatigue   Associated symptoms include headaches (resolved). Pertinent negatives include no chest pain and no abdominal pain.     Review of patient's allergies indicates:   Allergen Reactions    Percocet [oxycodone-acetaminophen]      Past Medical History:   Diagnosis Date    Irregular heart rate     Ovarian cyst      Past Surgical History:   Procedure Laterality Date    DILATION AND CURETTAGE OF UTERUS      PARTIAL HYSTERECTOMY      TUBAL LIGATION       History reviewed. No pertinent family history.  Social History " "    Tobacco Use    Smoking status: Current Every Day Smoker    Smokeless tobacco: Never Used   Substance Use Topics    Alcohol use: No    Drug use: No     Review of Systems   Constitutional: Positive for appetite change (decreased), fatigue and fever (subjective).   HENT: Negative for congestion and rhinorrhea.    Eyes: Negative for visual disturbance.   Cardiovascular: Positive for palpitations (imterittent "heart pounding", reports prior diagnosis of "irregular heartbeat"). Negative for chest pain.   Gastrointestinal: Positive for nausea. Negative for abdominal pain, constipation, diarrhea and vomiting.   Genitourinary: Negative for dysuria and hematuria.   Musculoskeletal: Negative for neck stiffness.   Skin: Negative for rash.   Neurological: Positive for light-headedness and headaches (resolved). Negative for syncope.       Physical Exam     Initial Vitals [09/08/19 1859]   BP Pulse Resp Temp SpO2   (!) 140/84 107 16 98.9 °F (37.2 °C) 100 %      MAP       --         Physical Exam    Nursing note and vitals reviewed.  Constitutional: She appears well-developed and well-nourished.   HENT:   Head: Normocephalic and atraumatic.   Eyes: Conjunctivae are normal. Pupils are equal, round, and reactive to light. No scleral icterus.   Neck: Normal range of motion and phonation normal. Neck supple.   Cardiovascular: Normal rate and intact distal pulses.   Pulmonary/Chest: Effort normal. No stridor. No respiratory distress.   Musculoskeletal: Normal range of motion.   Neurological: She is alert and oriented to person, place, and time.   Skin: Skin is warm and dry. Capillary refill takes less than 2 seconds. No rash noted.   Psychiatric: She has a normal mood and affect. Her behavior is normal.         ED Course   Procedures  Labs Reviewed   POCT URINALYSIS W/O SCOPE - Abnormal; Notable for the following components:       Result Value    Glucose, UA Trace (*)     Bilirubin, UA Negative (*)     Ketones, UA Negative (*)  "    Blood, UA Trace-intact (*)     Protein, UA Negative (*)     Nitrite, UA Negative (*)     Leukocytes, UA Negative (*)     All other components within normal limits   POCT URINALYSIS W/O SCOPE   POCT GLUCOSE          Imaging Results    None          Medical Decision Making:   History:   Old Medical Records: I decided to obtain old medical records.  Clinical Tests:   Lab Tests: Ordered and Reviewed           Labs Reviewed  Admission on 09/08/2019, Discharged on 09/08/2019   Component Date Value Ref Range Status    Glucose, UA 09/08/2019 Trace*  Final    Bilirubin, UA 09/08/2019 Negative*  Final    Ketones, UA 09/08/2019 Negative*  Final    Spec Grav UA 09/08/2019 1.020   Final    Blood, UA 09/08/2019 Trace-intact*  Final    PH, UA 09/08/2019 7.0   Final    Protein, UA 09/08/2019 Negative*  Final    Urobilinogen, UA 09/08/2019 1.0  E.U./dL Final    Nitrite, UA 09/08/2019 Negative*  Final    Leukocytes, UA 09/08/2019 Negative*  Final    Color, UA 09/08/2019 Yellow   Final    Clarity, UA 09/08/2019 Clear   Final    POCT Glucose 09/08/2019 87  70 - 110 mg/dL Final        Imaging Reviewed    Imaging Results    None         Medications given in ED    Medications   ketorolac injection 30 mg (30 mg Intramuscular Given 9/8/19 2017)       This document was produced by a scribe under my direction and in my presence. I agree with the content of the note and have made any necessary edits.     Taylor Frances MD         Note was created using voice recognition software. Note may have occasional typographical errors that may not have been identified and edited despite good remy initial review prior to signing.      Scribe Attestation:   Scribe #1: I performed the above scribed service and the documentation accurately describes the services I performed. I attest to the accuracy of the note.       Discharge Medications     Discharge Medication List as of 9/8/2019  9:15 PM      START taking these medications    Details    fluticasone propionate (FLONASE) 50 mcg/actuation nasal spray 2 sprays (100 mcg total) by Each Nostril route once daily., Starting Sun 9/8/2019, Print      loratadine (CLARITIN) 10 mg tablet Take 1 tablet (10 mg total) by mouth once daily., Starting Sun 9/8/2019, Until Mon 9/7/2020, OTC      methylPREDNISolone (MEDROL DOSEPACK) 4 mg tablet Take as directed., Print      montelukast (SINGULAIR) 10 mg tablet Take 1 tablet (10 mg total) by mouth every evening., Starting Sun 9/8/2019, Until Tue 10/8/2019, Print      naproxen (NAPROSYN) 500 MG tablet Take 1 tablet (500 mg total) by mouth 2 (two) times daily with meals. As needed for pain, Starting Sun 9/8/2019, Print         CONTINUE these medications which have NOT CHANGED    Details   baclofen (LIORESAL) 10 MG tablet Take 10 mg by mouth 3 (three) times daily., Historical Med      busPIRone (BUSPAR) 7.5 MG tablet Take 1 tablet (7.5 mg total) by mouth 3 (three) times daily., Starting Wed 3/13/2019, Print      ondansetron (ZOFRAN) 4 MG tablet Take 1 tablet (4 mg total) by mouth every 6 (six) hours., Starting Thu 5/30/2019, Print      polyethylene glycol (GLYCOLAX) 17 gram/dose powder Take 17 g by mouth once daily., Starting Sun 2/3/2019, Print                   Patient discharged to home in stable condition with instructions to:   1. Please take all meds as prescribed.  2. Follow-up with your primary care doctor   3. Return precautions discussed and patient and/or family/caretaker understands to return to the emergency room for any concerns including worsening of your current symptoms, fever, chills, night sweats, worsening pain, chest pain, shortness of breath, nausea, vomiting, diarrhea, bleeding, headache, difficulty talking, visual disturbances, weakness, numbness or any other acute concerns             Clinical Impression:     1. Sinus headache            Disposition:   Disposition: Discharged  Condition: Stable                        Taylor Frances MD  09/16/19  [id number]

## 2019-10-11 ENCOUNTER — HOSPITAL ENCOUNTER (EMERGENCY)
Facility: HOSPITAL | Age: 34
Discharge: HOME | End: 2019-10-11
Attending: EMERGENCY MEDICINE
Payer: MEDICAID

## 2019-10-11 VITALS
DIASTOLIC BLOOD PRESSURE: 85 MMHG | OXYGEN SATURATION: 100 % | RESPIRATION RATE: 16 BRPM | HEART RATE: 79 BPM | BODY MASS INDEX: 20.61 KG/M2 | WEIGHT: 112 LBS | SYSTOLIC BLOOD PRESSURE: 132 MMHG | TEMPERATURE: 98 F | HEIGHT: 62 IN

## 2019-10-11 DIAGNOSIS — R11.0 NAUSEA: ICD-10-CM

## 2019-10-11 DIAGNOSIS — R10.9 RIGHT FLANK PAIN: ICD-10-CM

## 2019-10-11 DIAGNOSIS — R10.821 RIGHT UPPER QUADRANT ABDOMINAL TENDERNESS WITH REBOUND TENDERNESS: Primary | ICD-10-CM

## 2019-10-11 LAB
ALBUMIN SERPL-MCNC: 4 G/DL (ref 3.3–5.5)
ALBUMIN SERPL-MCNC: 4.1 G/DL (ref 3.3–5.5)
ALP SERPL-CCNC: 61 U/L (ref 42–141)
ALP SERPL-CCNC: 63 U/L (ref 42–141)
BILIRUB SERPL-MCNC: 0.5 MG/DL (ref 0.2–1.6)
BILIRUB SERPL-MCNC: 0.5 MG/DL (ref 0.2–1.6)
BILIRUBIN, POC UA: NEGATIVE
BLOOD, POC UA: ABNORMAL
BUN SERPL-MCNC: 9 MG/DL (ref 7–22)
CALCIUM SERPL-MCNC: 9.5 MG/DL (ref 8–10.3)
CHLORIDE SERPL-SCNC: 109 MMOL/L (ref 98–108)
CLARITY, POC UA: CLEAR
COLOR, POC UA: YELLOW
CREAT SERPL-MCNC: 0.6 MG/DL (ref 0.6–1.2)
GLUCOSE SERPL-MCNC: 81 MG/DL (ref 73–118)
GLUCOSE, POC UA: NEGATIVE
KETONES, POC UA: NEGATIVE
LEUKOCYTE EST, POC UA: NEGATIVE
NITRITE, POC UA: NEGATIVE
PH UR STRIP: 6 [PH]
POC ALT (SGPT): 15 U/L (ref 10–47)
POC ALT (SGPT): 19 U/L (ref 10–47)
POC AMYLASE: 51 U/L (ref 14–97)
POC AST (SGOT): 27 U/L (ref 11–38)
POC AST (SGOT): 27 U/L (ref 11–38)
POC GGT: 19 U/L (ref 5–65)
POC TCO2: 27 MMOL/L (ref 18–33)
POTASSIUM BLD-SCNC: 3.7 MMOL/L (ref 3.6–5.1)
PROTEIN, POC UA: NEGATIVE
PROTEIN, POC: 6.8 G/DL (ref 6.4–8.1)
PROTEIN, POC: 7.1 G/DL (ref 6.4–8.1)
SODIUM BLD-SCNC: 144 MMOL/L (ref 128–145)
SPECIFIC GRAVITY, POC UA: 1.01
UROBILINOGEN, POC UA: 0.2 E.U./DL

## 2019-10-11 PROCEDURE — 63600175 PHARM REV CODE 636 W HCPCS: Mod: ER | Performed by: PHYSICIAN ASSISTANT

## 2019-10-11 PROCEDURE — 96374 THER/PROPH/DIAG INJ IV PUSH: CPT | Mod: ER,59

## 2019-10-11 PROCEDURE — 25500020 PHARM REV CODE 255: Mod: ER | Performed by: EMERGENCY MEDICINE

## 2019-10-11 PROCEDURE — S0028 INJECTION, FAMOTIDINE, 20 MG: HCPCS | Mod: ER | Performed by: PHYSICIAN ASSISTANT

## 2019-10-11 PROCEDURE — 25000003 PHARM REV CODE 250: Mod: ER | Performed by: PHYSICIAN ASSISTANT

## 2019-10-11 PROCEDURE — 99285 EMERGENCY DEPT VISIT HI MDM: CPT | Mod: 25,ER

## 2019-10-11 PROCEDURE — 81003 URINALYSIS AUTO W/O SCOPE: CPT | Mod: ER

## 2019-10-11 PROCEDURE — 96376 TX/PRO/DX INJ SAME DRUG ADON: CPT | Mod: ER

## 2019-10-11 PROCEDURE — 96375 TX/PRO/DX INJ NEW DRUG ADDON: CPT | Mod: 59,ER

## 2019-10-11 PROCEDURE — 96361 HYDRATE IV INFUSION ADD-ON: CPT | Mod: ER

## 2019-10-11 RX ORDER — KETOROLAC TROMETHAMINE 30 MG/ML
15 INJECTION, SOLUTION INTRAMUSCULAR; INTRAVENOUS
Status: COMPLETED | OUTPATIENT
Start: 2019-10-11 | End: 2019-10-11

## 2019-10-11 RX ORDER — FAMOTIDINE 10 MG/ML
40 INJECTION INTRAVENOUS
Status: COMPLETED | OUTPATIENT
Start: 2019-10-11 | End: 2019-10-11

## 2019-10-11 RX ORDER — MORPHINE SULFATE 8 MG/ML
3 INJECTION INTRAMUSCULAR; INTRAVENOUS; SUBCUTANEOUS
Status: COMPLETED | OUTPATIENT
Start: 2019-10-11 | End: 2019-10-11

## 2019-10-11 RX ORDER — ONDANSETRON 2 MG/ML
4 INJECTION INTRAMUSCULAR; INTRAVENOUS
Status: COMPLETED | OUTPATIENT
Start: 2019-10-11 | End: 2019-10-11

## 2019-10-11 RX ORDER — MELOXICAM 7.5 MG/1
7.5 TABLET ORAL DAILY
Qty: 12 TABLET | Refills: 0 | OUTPATIENT
Start: 2019-10-11 | End: 2021-12-14

## 2019-10-11 RX ORDER — MORPHINE SULFATE 8 MG/ML
2 INJECTION INTRAMUSCULAR; INTRAVENOUS; SUBCUTANEOUS
Status: COMPLETED | OUTPATIENT
Start: 2019-10-11 | End: 2019-10-11

## 2019-10-11 RX ORDER — ONDANSETRON 4 MG/1
8 TABLET, FILM COATED ORAL EVERY 12 HOURS PRN
Qty: 12 TABLET | Refills: 0 | Status: SHIPPED | OUTPATIENT
Start: 2019-10-11 | End: 2021-03-24

## 2019-10-11 RX ORDER — PANTOPRAZOLE SODIUM 20 MG/1
40 TABLET, DELAYED RELEASE ORAL DAILY
Qty: 14 TABLET | Refills: 0 | OUTPATIENT
Start: 2019-10-11 | End: 2020-08-07

## 2019-10-11 RX ADMIN — SODIUM CHLORIDE 1000 ML: 0.9 INJECTION, SOLUTION INTRAVENOUS at 11:10

## 2019-10-11 RX ADMIN — IOHEXOL 75 ML: 350 INJECTION, SOLUTION INTRAVENOUS at 12:10

## 2019-10-11 RX ADMIN — FAMOTIDINE 40 MG: 10 INJECTION INTRAVENOUS at 01:10

## 2019-10-11 RX ADMIN — KETOROLAC TROMETHAMINE: 30 INJECTION, SOLUTION INTRAMUSCULAR at 11:10

## 2019-10-11 RX ADMIN — ONDANSETRON 4 MG: 2 INJECTION INTRAMUSCULAR; INTRAVENOUS at 11:10

## 2019-10-11 RX ADMIN — MORPHINE SULFATE 2 MG: 8 INJECTION, SOLUTION INTRAMUSCULAR; INTRAVENOUS at 11:10

## 2019-10-11 RX ADMIN — MORPHINE SULFATE 3.04 MG: 8 INJECTION, SOLUTION INTRAMUSCULAR; INTRAVENOUS at 01:10

## 2019-10-11 NOTE — ED PROVIDER NOTES
Encounter Date: 10/11/2019    SCRIBE #1 NOTE: I, Jessica Pierre , am scribing for, and in the presence of,  Amol KIRKLAND. I have scribed the following portions of the note - Other sections scribed: HPI, ROS, PE .       History     Chief Complaint   Patient presents with    Back Pain     since monday    Flank Pain     to right side, onset today, with right abd pain    Nausea     35 yo female presents to the ED with right lower flank pain that's radiates into her lower abdomen and lower back pain for 3 days. She states the pain started her in her back 5 days ago. She denies new activity or injury. Her back pain is exacerbated with movement. She notes nausea, but no vomiting. She denies fever, chills, dysuria, changes in urination, or hematuria. Current smoker. Hx of ovarian cyst on the right and hx of kidney infections. Family hx of kidney stones. No hx of gallbladder problems. She notes the pain is a little similar to when she had an ovarian cyst. She reports having some pain with eating last night. Recent EtOH use.     The history is provided by the patient. No  was used.     Review of patient's allergies indicates:   Allergen Reactions    Percocet [oxycodone-acetaminophen]      Past Medical History:   Diagnosis Date    Irregular heart rate     Ovarian cyst      Past Surgical History:   Procedure Laterality Date    DILATION AND CURETTAGE OF UTERUS      PARTIAL HYSTERECTOMY      TUBAL LIGATION       History reviewed. No pertinent family history.  Social History     Tobacco Use    Smoking status: Current Every Day Smoker    Smokeless tobacco: Never Used   Substance Use Topics    Alcohol use: No    Drug use: No     Review of Systems   Constitutional: Negative for chills and fever.   Gastrointestinal: Positive for abdominal pain (right ) and nausea. Negative for vomiting.   Genitourinary: Positive for flank pain (right ). Negative for decreased urine volume, dysuria, frequency and  hematuria.   Musculoskeletal: Positive for back pain.   All other systems reviewed and are negative.      Physical Exam     Initial Vitals [10/11/19 1126]   BP Pulse Resp Temp SpO2   124/73 88 19 98.2 °F (36.8 °C) 98 %      MAP       --         Physical Exam    Nursing note and vitals reviewed.  Constitutional: She appears well-developed and well-nourished. She is not diaphoretic. No distress.   HENT:   Head: Normocephalic and atraumatic.   Eyes: EOM are normal.   Pulmonary/Chest: No respiratory distress.   Neurological: She is alert and oriented to person, place, and time.   Skin: Skin is warm and dry. Capillary refill takes less than 2 seconds.   Psychiatric: She has a normal mood and affect. Her behavior is normal.         ED Course   Procedures  Labs Reviewed   POCT URINALYSIS W/O SCOPE - Abnormal; Notable for the following components:       Result Value    Blood, UA Trace-intact (*)     All other components within normal limits   POCT CMP - Abnormal; Notable for the following components:    POC Chloride 109 (*)     All other components within normal limits   POCT URINALYSIS W/O SCOPE   POCT CBC   POCT CMP   POCT AMYLASE   POCT LIVER PANEL          Imaging Results          US Abdomen Limited (Final result)  Result time 10/11/19 14:11:30    Final result by Peace Cantu MD (10/11/19 14:11:30)                 Impression:      Normal gallbladder and visualized common bile duct.      Electronically signed by: Peace Cantu  Date:    10/11/2019  Time:    14:11             Narrative:    EXAMINATION:  US ABDOMEN LIMITED    CLINICAL HISTORY:  RUQ abd pain;    TECHNIQUE:  Limited ultrasound of the right upper quadrant of the abdomen (including pancreas, liver, gallbladder, common bile duct, and spleen) was performed.    COMPARISON:  CT scan 10/11/2019 and 07/06/2017    FINDINGS:  The pancreatic body appears normal.  The pancreatic tail and head are not as well demonstrated but are probably normal.    The visualized  common bile duct is normal at 2.8 mm.    Gallbladder appears normal.  The wall is normal at 1.8 mm in thickness.  No pericholecystic fluid.  Patient is on pain medication invalidate and potential Estrada sign.  No gallstones seen.    No free fluid.    The visualized liver shows normal echotexture and architecture.  No fluid seen in Morison's pouch.                               CT Abdomen Pelvis With Contrast (Final result)  Result time 10/11/19 12:58:03    Final result by Filiberto Bustos MD (10/11/19 12:58:03)                 Impression:      No acute process or CT findings identified to explain patient's symptoms of right lower quadrant pain.  Specifically, no evidence of appendicitis.    Few additional findings as above.      Electronically signed by: Filiberto Bustos MD  Date:    10/11/2019  Time:    12:58             Narrative:    EXAMINATION:  CT ABDOMEN PELVIS WITH CONTRAST    CLINICAL HISTORY:  RLQ pain, appendicitis suspected;    TECHNIQUE:  Low dose axial images, sagittal and coronal reformations were obtained from the lung bases to the pubic symphysis following the IV administration of 75 mL of Omnipaque 350 .  Oral contrast was not given.    COMPARISON:  CT renal stone study 02/03/2019    FINDINGS:  Unchanged small nodule within the lingula.  Imaged lung bases are otherwise clear.  Base of the heart is within normal limits.    Liver is normal in size containing a stable tiny hypoattenuating parenchymal focus within the right lobe which is too small to characterize but likely a cyst.  Gallbladder, pancreas, spleen, stomach, duodenum and bilateral adrenal glands are within normal limits.  No biliary ductal dilatation.    Bilateral kidneys are normal in size, shape and location with symmetric normal enhancement.  No hydronephrosis or significant perinephric stranding.  Urinary bladder is within normal limits.  Uterus is not identified and likely surgically removed.  No adnexal mass.  No significant amount of  free fluid within the pelvis.  A few scattered punctate pelvic phleboliths noted.    No ascites, free air or lymphadenopathy.  No significant atherosclerosis.  No aortic aneurysm or dissection.    Appendix and terminal ileum are within normal limits.  No evidence of bowel obstruction or inflammation.  No pneumatosis or portal venous gas.    Osseous structures appear intact.                                 Medical Decision Making:   History:   Old Medical Records: I decided to obtain old medical records.  Clinical Tests:   Lab Tests: Ordered and Reviewed  Radiological Study: Ordered and Reviewed    This is an emergent evaluation of a 34 y.o. female with presenting to the ED for abdominal pain. Afebrile.     Will order screening labs and imaging while treating symptoms and monitoring patient.     Symptoms improve. Remains well appearing. Repeat abdominal exam now localized to the RUQ without Estrada's. Vitals stable. Labs unremarkable. Will add protonix and US gallbladder.     Workup completely unremarkable.  Specifically no evidence of acute cholecystitis.  Lipase is not available at this facility, but able is negative and reassuring against acute pancreatitis today.  No evidence to suggest pyelonephritis.  No overlying cellulitis or shingles.  I doubt occult cardiac and pulmonary process today.  No acute appendicitis.  No symptoms of obstruction or evidence on CT.  No PUD on CT. In the absence of other findings on workup, patient could have musculoskeletal injury as symptoms are very sharp and positional in nature. May also have a component of GERD as they are worse with food. Otherwise, I am not sure of the etiology of patient's symptoms, but they do not appear to be of emergent etiology today.     Discharged home with supportive care. Instructed to follow up with PCP for reevaluation and management of symptoms.     I discussed with the patient the diagnosis, treatment plan, indications for return to the emergency  department, and for expected follow-up. The patient verbalized an understanding. The patient is asked if there are any questions or concerns. We discuss the case, until all issues are addressed to the patients satisfaction. Patient understands and is agreeable to the plan.             Scribe Attestation:   Scribe #1: I performed the above scribed service and the documentation accurately describes the services I performed. I attest to the accuracy of the note.      Scribe attestation: I, Amol Marquez, personally performed the services described in this documentation. All medical record entries made by the scribe were at my direction and in my presence.  I have reviewed the chart and agree that the record reflects my personal performance and is accurate and complete            Clinical Impression:     1. Right upper quadrant abdominal tenderness with rebound tenderness    2. Right flank pain    3. Nausea                                   Amol Marquez PA-C  10/11/19 1500

## 2019-10-15 NOTE — ADDENDUM NOTE
Encounter addended by: Fabiola Prince on: 10/15/2019 1:23 PM   Actions taken: Charge Capture section accepted, Visit Navigator Flowsheet section accepted

## 2020-01-24 ENCOUNTER — HOSPITAL ENCOUNTER (EMERGENCY)
Facility: HOSPITAL | Age: 35
Discharge: HOME | End: 2020-01-24
Attending: EMERGENCY MEDICINE
Payer: MEDICAID

## 2020-01-24 VITALS
TEMPERATURE: 98 F | OXYGEN SATURATION: 100 % | WEIGHT: 120 LBS | RESPIRATION RATE: 18 BRPM | HEIGHT: 62 IN | BODY MASS INDEX: 22.08 KG/M2 | SYSTOLIC BLOOD PRESSURE: 116 MMHG | DIASTOLIC BLOOD PRESSURE: 74 MMHG | HEART RATE: 89 BPM

## 2020-01-24 DIAGNOSIS — R20.2 PARESTHESIAS: ICD-10-CM

## 2020-01-24 DIAGNOSIS — R09.81 NASAL CONGESTION: Primary | ICD-10-CM

## 2020-01-24 DIAGNOSIS — J32.9 SINUSITIS, UNSPECIFIED CHRONICITY, UNSPECIFIED LOCATION: ICD-10-CM

## 2020-01-24 PROCEDURE — 99283 EMERGENCY DEPT VISIT LOW MDM: CPT | Mod: ER

## 2020-01-24 RX ORDER — DIAZEPAM 5 MG/1
5 TABLET ORAL EVERY 8 HOURS PRN
Qty: 15 TABLET | Refills: 0 | OUTPATIENT
Start: 2020-01-24 | End: 2023-09-18

## 2020-01-24 NOTE — ED PROVIDER NOTES
Encounter Date: 1/24/2020    SCRIBE #1 NOTE: I, Jessica Pierre, am scribing for, and in the presence of,  Dr. Zimmer . I have scribed the following portions of the note - Other sections scribed: HPI, ROS, PE .       History     Chief Complaint   Patient presents with    Numbness     PT W C/O RIGHT SIDED FACIAL NUMBNESS RADIATING DOWN RIGHT ARM X 1 DAY    Nasal Congestion     X 1 WEEK     Ms. Davis is a 35 y.o female with a hx of anxiety and scoliosis presents to the ED with intermittent numbness and tingling to the top of the right arm and right side of the face intermittently since yesterday evening. No fall or injury. She reports some soreness in her right shoulder and she sometimes has neck pain on the right.She recently received a steroid shot for her chronic back pain 4 days ago. She also reports being told by her PCP several days ago that she is very congested and was prescribed antibiotics. She has not filled the prescription yet. She denies weakness, dizziness, visual changes, change in speech, HA, or gait disturbance. Current smoker.She starts physical therapy next week for her back. Patient started medication for high cholesterol this past month after having blood work done.       The history is provided by the patient. No  was used.     Review of patient's allergies indicates:   Allergen Reactions    Percocet [oxycodone-acetaminophen]      Past Medical History:   Diagnosis Date    Anxiety     Hiatal hernia     Irregular heart rate     Ovarian cyst     Scoliosis      Past Surgical History:   Procedure Laterality Date    DILATION AND CURETTAGE OF UTERUS      PARTIAL HYSTERECTOMY      TUBAL LIGATION       No family history on file.  Social History     Tobacco Use    Smoking status: Current Every Day Smoker     Packs/day: 0.50     Types: Cigarettes    Smokeless tobacco: Never Used   Substance Use Topics    Alcohol use: Yes     Comment: OCCASIONAL    Drug use: Yes     Types:  Marijuana     Review of Systems   HENT: Positive for congestion (x 1 month).    Eyes: Negative for visual disturbance.   Musculoskeletal: Positive for arthralgias (right shoulder ) and back pain (chronic lower ). Negative for gait problem and neck pain.   Neurological: Positive for numbness. Negative for dizziness, speech difficulty, weakness and headaches.        Tingling to top of R arm from mid humerus to back of hand but not presently.    All other systems reviewed and are negative.      Physical Exam     Initial Vitals [01/24/20 0916]   BP Pulse Resp Temp SpO2   121/81 107 18 97.6 °F (36.4 °C) 100 %      MAP       --         Physical Exam    Nursing note and vitals reviewed.  Constitutional: She appears well-developed and well-nourished. She is not diaphoretic. No distress.   HENT:   Head: Normocephalic and atraumatic.   Right Ear: Tympanic membrane, external ear and ear canal normal.   Left Ear: Tympanic membrane, external ear and ear canal normal.   Mouth/Throat: Uvula is midline, oropharynx is clear and moist and mucous membranes are normal. No oropharyngeal exudate.   Mucosal edema, more on R, pt blew nose once and had trace blood streak in clear mucous. No facial swelling, normal speech   Eyes: Conjunctivae and EOM are normal. Pupils are equal, round, and reactive to light.   Neck: Normal range of motion. Neck supple.   Cardiovascular: Normal rate, regular rhythm and normal heart sounds. Exam reveals no gallop and no friction rub.    No murmur heard.  Pulmonary/Chest: Effort normal and breath sounds normal. No respiratory distress. She has no wheezes. She has no rhonchi. She has no rales.   Abdominal: Soft.   Musculoskeletal: Normal range of motion.        Right shoulder: She exhibits pain. She exhibits normal range of motion, no tenderness, no bony tenderness, no swelling, no effusion, no crepitus, no deformity, no laceration, no spasm, normal pulse and normal strength.        Lumbar back: She exhibits  pain. She exhibits normal range of motion, no tenderness, no bony tenderness, no swelling, no edema, no deformity, no laceration, no spasm and normal pulse.   Neurological: She is alert and oriented to person, place, and time. She has normal strength and normal reflexes. No cranial nerve deficit or sensory deficit. GCS score is 15. GCS eye subscore is 4. GCS verbal subscore is 5. GCS motor subscore is 6.   Cranial nerves II-XII intact. Sensation grossly intact. Bilateral  strength equal. Strength 5/5 to all extremities. Deep tendon reflexes normal. Normal heel/toe walking, romberg negative.      Skin: Skin is warm and dry. Capillary refill takes less than 2 seconds.   Psychiatric: She has a normal mood and affect. Her behavior is normal.         ED Course   Procedures  Labs Reviewed - No data to display       Imaging Results    None          Medical Decision Making:   ED Management:  Pt with clinical sinusitis, has abx but hasn't filled yet, advised to fill and take. No arm paresthesias currently. I clinicaly suspect some cervical radiculopathy that has caused the intermittent R arm tingling that is not present currently. R cheek paresthesia may be related to sinusitis. Pt has some significant anxiety and was tearful at times. She has had some paresthesias in the past related to anxiety. We discussed trying valium instead of baclofen for her back pain and getting her abx rx filled. Discussed ddx of intermittent paresthesias without weakness that includes stroke, MS, other pathology not ruled out without imaging. Discussed risks and benefits of CT at this time openly. She would like to try abx and muscle relaxants and reassurance and will return if symptoms in arm return or if she has any new problems. . We discussed home care and return precautions and she and her mom are happy with the plan.             Scribe Attestation:   Scribe #1: I performed the above scribed service and the documentation accurately  describes the services I performed. I attest to the accuracy of the note.                    Clinical Impression:     1. Nasal congestion    2. Sinusitis, unspecified chronicity, unspecified location    3. Paresthesias                                Coni Zimmer MD  01/25/20 6342

## 2020-01-24 NOTE — DISCHARGE INSTRUCTIONS
Rest. Drink plenty of fluids. Take your antibiotics as discussed. Return for any new or acute problems or concerns.

## 2020-02-29 ENCOUNTER — HOSPITAL ENCOUNTER (EMERGENCY)
Facility: HOSPITAL | Age: 35
Discharge: HOME | End: 2020-02-29
Attending: INTERNAL MEDICINE
Payer: MEDICAID

## 2020-02-29 VITALS
SYSTOLIC BLOOD PRESSURE: 121 MMHG | TEMPERATURE: 99 F | WEIGHT: 113 LBS | HEIGHT: 62 IN | BODY MASS INDEX: 20.8 KG/M2 | RESPIRATION RATE: 18 BRPM | OXYGEN SATURATION: 100 % | HEART RATE: 91 BPM | DIASTOLIC BLOOD PRESSURE: 75 MMHG

## 2020-02-29 DIAGNOSIS — J06.9 ACUTE URI: Primary | ICD-10-CM

## 2020-02-29 PROCEDURE — 99284 EMERGENCY DEPT VISIT MOD MDM: CPT | Mod: ER

## 2020-02-29 RX ORDER — FLUTICASONE PROPIONATE 50 MCG
2 SPRAY, SUSPENSION (ML) NASAL DAILY
Qty: 15 G | Refills: 0 | OUTPATIENT
Start: 2020-02-29 | End: 2020-06-29

## 2020-02-29 RX ORDER — IBUPROFEN 600 MG/1
600 TABLET ORAL 3 TIMES DAILY
Qty: 30 TABLET | Refills: 0 | OUTPATIENT
Start: 2020-02-29 | End: 2021-10-21

## 2020-02-29 RX ORDER — AZELASTINE 1 MG/ML
2 SPRAY, METERED NASAL 2 TIMES DAILY
Qty: 30 ML | Refills: 0 | OUTPATIENT
Start: 2020-02-29 | End: 2021-12-14

## 2020-02-29 NOTE — ED PROVIDER NOTES
Encounter Date: 2/29/2020       History     Chief Complaint   Patient presents with    Cough     COUGH AND CONGESTION X 6 DAYS; DENIES FEVER     35-year-old female presents to the emergency department complaining cough, nasal congestion, sore throat and postnasal drip x1 week.  She denies fever/chills/nausea/vomiting.  She states she is an everyday cigarette smoker.    The history is provided by the patient. No  was used.     Review of patient's allergies indicates:   Allergen Reactions    Percocet [oxycodone-acetaminophen]      Past Medical History:   Diagnosis Date    Anxiety     Hiatal hernia     Irregular heart rate     Ovarian cyst     Scoliosis      Past Surgical History:   Procedure Laterality Date    DILATION AND CURETTAGE OF UTERUS      PARTIAL HYSTERECTOMY      TUBAL LIGATION       No family history on file.  Social History     Tobacco Use    Smoking status: Current Every Day Smoker     Packs/day: 1.00     Types: Cigarettes    Smokeless tobacco: Never Used   Substance Use Topics    Alcohol use: Yes     Comment: OCCASIONAL    Drug use: Yes     Types: Marijuana     Review of Systems   Constitutional: Negative for chills and fever.   HENT: Positive for congestion, postnasal drip, rhinorrhea and sore throat.    Respiratory: Positive for cough.    Cardiovascular: Negative for chest pain.   Gastrointestinal: Negative for vomiting.   All other systems reviewed and are negative.      Physical Exam     Initial Vitals [02/29/20 0724]   BP Pulse Resp Temp SpO2   121/75 91 18 98.5 °F (36.9 °C) 100 %      MAP       --         Physical Exam    Nursing note and vitals reviewed.  Constitutional: She appears well-developed and well-nourished.   HENT:   Head: Normocephalic and atraumatic.   Right Ear: External ear normal.   Left Ear: External ear normal.   Eyes: Conjunctivae are normal.   Neck: Normal range of motion.   Cardiovascular: Normal rate and regular rhythm.   Pulmonary/Chest:  Breath sounds normal. No respiratory distress.   Abdominal: Soft.   Musculoskeletal: Normal range of motion.   Neurological: She is alert.   Skin: Skin is warm and dry.   Psychiatric: She has a normal mood and affect.         ED Course   Procedures  Labs Reviewed - No data to display       Imaging Results    None          Medical Decision Making:   Initial Assessment:   35-year-old female presents to the emergency department complaining cough, nasal congestion, sore throat and postnasal drip x1 week.  She denies fever/chills/nausea/vomiting.  She states she is an everyday cigarette smoker.  ED Management:  Patient was given instructions for acute URI, received prescriptions for Astelin/fluticasone and was advised to follow up with her primary care physician within the next week for re-evaluation/return to the emergency department if condition worsens.                                 Clinical Impression:       ICD-10-CM ICD-9-CM   1. Acute URI J06.9 465.9         Disposition:   Disposition: Discharged  Condition: Stable                        Hal Montes De Oca MD  03/02/20 0039

## 2020-06-29 ENCOUNTER — HOSPITAL ENCOUNTER (EMERGENCY)
Facility: HOSPITAL | Age: 35
Discharge: HOME | End: 2020-06-29
Attending: EMERGENCY MEDICINE
Payer: MEDICAID

## 2020-06-29 VITALS
DIASTOLIC BLOOD PRESSURE: 74 MMHG | WEIGHT: 113 LBS | HEIGHT: 62 IN | HEART RATE: 71 BPM | OXYGEN SATURATION: 100 % | SYSTOLIC BLOOD PRESSURE: 110 MMHG | BODY MASS INDEX: 20.8 KG/M2 | RESPIRATION RATE: 16 BRPM | TEMPERATURE: 98 F

## 2020-06-29 DIAGNOSIS — J06.9 UPPER RESPIRATORY TRACT INFECTION, UNSPECIFIED TYPE: ICD-10-CM

## 2020-06-29 DIAGNOSIS — R05.9 COUGH: ICD-10-CM

## 2020-06-29 DIAGNOSIS — R09.82 ALLERGIC RHINITIS WITH POSTNASAL DRIP: Primary | ICD-10-CM

## 2020-06-29 DIAGNOSIS — J30.9 ALLERGIC RHINITIS WITH POSTNASAL DRIP: Primary | ICD-10-CM

## 2020-06-29 LAB
BILIRUBIN, POC UA: NEGATIVE
BLOOD, POC UA: ABNORMAL
CLARITY, POC UA: CLEAR
COLOR, POC UA: YELLOW
CTP QC/QA: YES
CTP QC/QA: YES
GLUCOSE, POC UA: NEGATIVE
KETONES, POC UA: NEGATIVE
LEUKOCYTE EST, POC UA: NEGATIVE
NITRITE, POC UA: NEGATIVE
PH UR STRIP: 5.5 [PH]
POC MOLECULAR INFLUENZA A AGN: NEGATIVE
POC MOLECULAR INFLUENZA B AGN: NEGATIVE
PROTEIN, POC UA: NEGATIVE
SARS-COV-2 RDRP RESP QL NAA+PROBE: NEGATIVE
SPECIFIC GRAVITY, POC UA: >=1.03
UROBILINOGEN, POC UA: 0.2 E.U./DL

## 2020-06-29 PROCEDURE — 81003 URINALYSIS AUTO W/O SCOPE: CPT | Mod: ER

## 2020-06-29 PROCEDURE — U0002 COVID-19 LAB TEST NON-CDC: HCPCS | Mod: ER | Performed by: EMERGENCY MEDICINE

## 2020-06-29 PROCEDURE — 87502 INFLUENZA DNA AMP PROBE: CPT | Mod: ER

## 2020-06-29 PROCEDURE — 93005 ELECTROCARDIOGRAM TRACING: CPT | Mod: ER

## 2020-06-29 PROCEDURE — 25000003 PHARM REV CODE 250: Mod: ER | Performed by: EMERGENCY MEDICINE

## 2020-06-29 PROCEDURE — 93010 ELECTROCARDIOGRAM REPORT: CPT | Mod: ,,, | Performed by: INTERNAL MEDICINE

## 2020-06-29 PROCEDURE — 93010 EKG 12-LEAD: ICD-10-PCS | Mod: ,,, | Performed by: INTERNAL MEDICINE

## 2020-06-29 PROCEDURE — 99284 EMERGENCY DEPT VISIT MOD MDM: CPT | Mod: 25,ER

## 2020-06-29 RX ORDER — HYDROXYZINE HYDROCHLORIDE 25 MG/1
25 TABLET, FILM COATED ORAL 3 TIMES DAILY PRN
Qty: 12 TABLET | Refills: 0 | OUTPATIENT
Start: 2020-06-29 | End: 2021-12-14

## 2020-06-29 RX ORDER — FLUTICASONE PROPIONATE 50 MCG
1 SPRAY, SUSPENSION (ML) NASAL 2 TIMES DAILY
Qty: 16 G | Refills: 0 | OUTPATIENT
Start: 2020-06-29 | End: 2021-10-21

## 2020-06-29 RX ORDER — LORATADINE 10 MG/1
10 TABLET ORAL DAILY
Qty: 60 TABLET | Refills: 0 | OUTPATIENT
Start: 2020-06-29 | End: 2021-03-24

## 2020-06-29 RX ORDER — PROMETHAZINE HYDROCHLORIDE AND DEXTROMETHORPHAN HYDROBROMIDE 6.25; 15 MG/5ML; MG/5ML
5 SYRUP ORAL 3 TIMES DAILY PRN
Qty: 200 ML | Refills: 0 | Status: SHIPPED | OUTPATIENT
Start: 2020-06-29 | End: 2020-07-09

## 2020-06-29 RX ORDER — ONDANSETRON 4 MG/1
4 TABLET, ORALLY DISINTEGRATING ORAL
Status: DISCONTINUED | OUTPATIENT
Start: 2020-06-29 | End: 2020-06-29 | Stop reason: HOSPADM

## 2020-06-29 RX ORDER — ACETAMINOPHEN 500 MG
1000 TABLET ORAL
Status: COMPLETED | OUTPATIENT
Start: 2020-06-29 | End: 2020-06-29

## 2020-06-29 RX ORDER — ONDANSETRON 4 MG/1
4 TABLET, ORALLY DISINTEGRATING ORAL ONCE
Status: COMPLETED | OUTPATIENT
Start: 2020-06-29 | End: 2020-06-29

## 2020-06-29 RX ORDER — FAMOTIDINE 20 MG/1
20 TABLET, FILM COATED ORAL 2 TIMES DAILY
Qty: 20 TABLET | Refills: 0 | OUTPATIENT
Start: 2020-06-29 | End: 2021-03-24

## 2020-06-29 RX ADMIN — ONDANSETRON 4 MG: 4 TABLET, ORALLY DISINTEGRATING ORAL at 08:06

## 2020-06-29 RX ADMIN — ACETAMINOPHEN 1000 MG: 500 TABLET ORAL at 08:06

## 2020-06-29 NOTE — Clinical Note
Jordana Ryan was seen and treated in our emergency department on 6/29/2020.  She may return to work on 07/03/2020.       If you have any questions or concerns, please don't hesitate to call.      Omaira Elizalde, DO

## 2020-06-29 NOTE — ED PROVIDER NOTES
Encounter Date: 6/29/2020    SCRIBE #1 NOTE: I, Shahida Frost, am scribing for, and in the presence of,  Dr. Elizalde. I have scribed the following portions of the note - the EKG reading. Other sections scribed: HPI, ROS, PE.       History     Chief Complaint   Patient presents with    Cough     pt reports cough and bodyh aches for several days. Pt had a rapid covid done yesterday and it was negative, also saw her primary care and was given medications.      Jordana Davis is a 35 y.o. female who presents to the ED complaining of rhinorrhea x 5 days. Reports body aches and chills. States she took Valium last night with some relief. States she tested negative for COVID yesterday, saw her PCP and was prescribed medications. Has not been taking medications because they give her abdominal cramps and diarrhea. Patient is concerned she has COVID. PSHx of hysterectomy.  Patient reports history of anxiety.  Her concern for having corona virus is making her anxiety worse.  Patient took her prescribed Valium at home which helped a lot with her anxiety.    The history is provided by the patient. No  was used.     Review of patient's allergies indicates:   Allergen Reactions    Percocet [oxycodone-acetaminophen]      Past Medical History:   Diagnosis Date    Anxiety     Hiatal hernia     Irregular heart rate     Ovarian cyst     Scoliosis      Past Surgical History:   Procedure Laterality Date    DILATION AND CURETTAGE OF UTERUS      PARTIAL HYSTERECTOMY      TUBAL LIGATION       No family history on file.  Social History     Tobacco Use    Smoking status: Current Every Day Smoker     Packs/day: 1.00     Types: Cigarettes    Smokeless tobacco: Never Used   Substance Use Topics    Alcohol use: Yes     Comment: OCCASIONAL    Drug use: Yes     Types: Marijuana     Review of Systems   Constitutional: Positive for chills. Negative for fever.   HENT: Positive for rhinorrhea. Negative for sore  throat.    Respiratory: Negative for shortness of breath.    Cardiovascular: Negative for chest pain.   Gastrointestinal: Negative for nausea.   Genitourinary: Negative for dysuria.   Musculoskeletal: Positive for myalgias. Negative for back pain.   Skin: Negative for rash.   Neurological: Negative for weakness.   Hematological: Does not bruise/bleed easily.   Psychiatric/Behavioral: The patient is nervous/anxious.    All other systems reviewed and are negative.      Physical Exam     Initial Vitals [06/29/20 0740]   BP Pulse Resp Temp SpO2   126/84 87 18 98.1 °F (36.7 °C) 99 %      MAP       --         Patient gave consent to have physical exam performed.    Physical Exam    Nursing note and vitals reviewed.  Constitutional: She appears well-developed and well-nourished.   HENT:   Head: Normocephalic and atraumatic.   Right Ear: External ear normal.   Left Ear: External ear normal.   Nose: Mucosal edema and rhinorrhea present.   Mouth/Throat: Oropharynx is clear and moist.   +post nasal drip   Eyes: Conjunctivae and EOM are normal. Pupils are equal, round, and reactive to light.   Neck: Normal range of motion and phonation normal. Neck supple.   Cardiovascular: Normal rate, regular rhythm, normal heart sounds and intact distal pulses. Exam reveals no gallop and no friction rub.    No murmur heard.  Pulmonary/Chest: Effort normal and breath sounds normal. No stridor. No respiratory distress. She has no wheezes. She has no rhonchi. She has no rales. She exhibits no tenderness.   Abdominal: Soft. Bowel sounds are normal. She exhibits no distension. There is no abdominal tenderness. There is no rigidity, no rebound and no guarding.   Musculoskeletal: Normal range of motion. No tenderness or edema.   Neurological: She is alert and oriented to person, place, and time. She has normal strength. No cranial nerve deficit or sensory deficit. GCS score is 15. GCS eye subscore is 4. GCS verbal subscore is 5. GCS motor subscore  is 6.   Skin: Skin is warm and dry. Capillary refill takes less than 2 seconds. No rash noted.   Psychiatric: Her behavior is normal. Her mood appears anxious.         ED Course   Procedures  Labs Reviewed   POCT URINALYSIS W/O SCOPE - Abnormal; Notable for the following components:       Result Value    Spec Grav UA >=1.030 (*)     Blood, UA 2+ (*)     All other components within normal limits   POCT URINALYSIS W/O SCOPE   POCT INFLUENZA A/B MOLECULAR   SARS-COV-2 RDRP GENE     EKG Readings: (Independently Interpreted)   Initial Reading: No STEMI. Rhythm: Normal Sinus Rhythm. Heart Rate: 82. Axis: Normal.   Normal EKG. QTc normal at 425. When compared to prior EKG dated 3-13-19 rate decreased by 5 bpm.        Imaging Results          X-Ray Chest 1 View (Final result)  Result time 06/29/20 09:02:41   Procedure changed from X-Ray Chest PA And Lateral     Final result by Soraya Patel MD (06/29/20 09:02:41)                 Impression:      Clear lungs.      Electronically signed by: Soraya Patel MD  Date:    06/29/2020  Time:    09:02             Narrative:    EXAMINATION:  XR CHEST 1 VIEW    CLINICAL HISTORY:  cough; Cough    TECHNIQUE:  Single frontal view of the chest was performed.    COMPARISON:  Prior dated 03/13/2019    FINDINGS:  The mediastinal structures are midline.  The cardiac silhouette is not enlarged.  The lungs appear grossly clear.    No osseous abnormalities are seen.                                 Medical Decision Making:   History:   Old Medical Records: I decided to obtain old medical records.  Chief complaint: rhinorrhea and body aches  Differential diagnosis:  Anxiety, viral illnes, UTI, COVID-19, influenza A, influenza B, bronchitis, pneumonia    Treatment in the ED: PE, ondansetron and acetaminophen  Patient reports feeling better after treatment in the ER.      Discussed treatment, prescriptions, labs, and imaging results.     Fill and take prescriptions as directed.  Return to  the ED if symptoms worsen or do not resolve.   Answered questions and discussed discharge plan.    Patient feels better and is ready for discharge.  Follow up with PCP/specialist in 1 day.            Scribe Attestation:   Scribe #1: I performed the above scribed service and the documentation accurately describes the services I performed. I attest to the accuracy of the note.     I, Dr. Omaira Elizalde, personally performed the services described in this documentation. This document was produced by a scribe under my direction and in my presence. All medical record entries made by the scribe were at my direction and in my presence.  I have reviewed the chart and agree that the record reflects my personal performance and is accurate and complete. Omaira Elizalde, .     06/29/2020 12:11 PM                        Clinical Impression:     1. Allergic rhinitis with postnasal drip    2. Cough    3. Upper respiratory tract infection, unspecified type                                   Omaira Elizadle DO  06/29/20 1212

## 2020-08-07 ENCOUNTER — HOSPITAL ENCOUNTER (EMERGENCY)
Facility: HOSPITAL | Age: 35
Discharge: HOME | End: 2020-08-07
Attending: EMERGENCY MEDICINE
Payer: MEDICAID

## 2020-08-07 VITALS
DIASTOLIC BLOOD PRESSURE: 87 MMHG | SYSTOLIC BLOOD PRESSURE: 122 MMHG | HEART RATE: 79 BPM | BODY MASS INDEX: 20.8 KG/M2 | WEIGHT: 113 LBS | HEIGHT: 62 IN | RESPIRATION RATE: 14 BRPM | OXYGEN SATURATION: 98 % | TEMPERATURE: 99 F

## 2020-08-07 DIAGNOSIS — R07.9 CHEST PAIN: ICD-10-CM

## 2020-08-07 LAB
ALBUMIN SERPL BCP-MCNC: 5 G/DL (ref 3.5–5.2)
ALP SERPL-CCNC: 69 U/L (ref 55–135)
ALT SERPL W/O P-5'-P-CCNC: 13 U/L (ref 10–44)
ANION GAP SERPL CALC-SCNC: 8 MMOL/L (ref 8–16)
AST SERPL-CCNC: 17 U/L (ref 10–40)
BASOPHILS # BLD AUTO: 0.06 K/UL (ref 0–0.2)
BASOPHILS NFR BLD: 0.6 % (ref 0–1.9)
BILIRUB SERPL-MCNC: 0.3 MG/DL (ref 0.1–1)
BUN SERPL-MCNC: 10 MG/DL (ref 6–20)
CALCIUM SERPL-MCNC: 10 MG/DL (ref 8.7–10.5)
CHLORIDE SERPL-SCNC: 101 MMOL/L (ref 95–110)
CO2 SERPL-SCNC: 27 MMOL/L (ref 23–29)
CREAT SERPL-MCNC: 0.7 MG/DL (ref 0.5–1.4)
DIFFERENTIAL METHOD: ABNORMAL
EOSINOPHIL # BLD AUTO: 0.1 K/UL (ref 0–0.5)
EOSINOPHIL NFR BLD: 0.5 % (ref 0–8)
ERYTHROCYTE [DISTWIDTH] IN BLOOD BY AUTOMATED COUNT: 11.4 % (ref 11.5–14.5)
EST. GFR  (AFRICAN AMERICAN): >60 ML/MIN/1.73 M^2
EST. GFR  (NON AFRICAN AMERICAN): >60 ML/MIN/1.73 M^2
GLUCOSE SERPL-MCNC: 90 MG/DL (ref 70–110)
HCT VFR BLD AUTO: 44.4 % (ref 37–48.5)
HGB BLD-MCNC: 14.9 G/DL (ref 12–16)
IMM GRANULOCYTES # BLD AUTO: 0.02 K/UL (ref 0–0.04)
IMM GRANULOCYTES NFR BLD AUTO: 0.2 % (ref 0–0.5)
LYMPHOCYTES # BLD AUTO: 2.2 K/UL (ref 1–4.8)
LYMPHOCYTES NFR BLD: 22.2 % (ref 18–48)
MCH RBC QN AUTO: 33.6 PG (ref 27–31)
MCHC RBC AUTO-ENTMCNC: 33.6 G/DL (ref 32–36)
MCV RBC AUTO: 100 FL (ref 82–98)
MONOCYTES # BLD AUTO: 0.6 K/UL (ref 0.3–1)
MONOCYTES NFR BLD: 5.8 % (ref 4–15)
NEUTROPHILS # BLD AUTO: 7 K/UL (ref 1.8–7.7)
NEUTROPHILS NFR BLD: 70.7 % (ref 38–73)
NRBC BLD-RTO: 0 /100 WBC
PLATELET # BLD AUTO: 309 K/UL (ref 150–350)
PMV BLD AUTO: 10.1 FL (ref 9.2–12.9)
POCT GLUCOSE: 79 MG/DL (ref 70–110)
POTASSIUM SERPL-SCNC: 3.6 MMOL/L (ref 3.5–5.1)
PROT SERPL-MCNC: 7.5 G/DL (ref 6–8.4)
RBC # BLD AUTO: 4.44 M/UL (ref 4–5.4)
SODIUM SERPL-SCNC: 136 MMOL/L (ref 136–145)
TROPONIN I SERPL DL<=0.01 NG/ML-MCNC: <0.006 NG/ML (ref 0–0.03)
WBC # BLD AUTO: 9.87 K/UL (ref 3.9–12.7)

## 2020-08-07 PROCEDURE — 93010 ELECTROCARDIOGRAM REPORT: CPT | Mod: ,,, | Performed by: INTERNAL MEDICINE

## 2020-08-07 PROCEDURE — 99285 EMERGENCY DEPT VISIT HI MDM: CPT | Mod: 25

## 2020-08-07 PROCEDURE — 93010 EKG 12-LEAD: ICD-10-PCS | Mod: ,,, | Performed by: INTERNAL MEDICINE

## 2020-08-07 PROCEDURE — 25000003 PHARM REV CODE 250: Performed by: PHYSICIAN ASSISTANT

## 2020-08-07 PROCEDURE — 80053 COMPREHEN METABOLIC PANEL: CPT

## 2020-08-07 PROCEDURE — 84484 ASSAY OF TROPONIN QUANT: CPT

## 2020-08-07 PROCEDURE — 82962 GLUCOSE BLOOD TEST: CPT

## 2020-08-07 PROCEDURE — 93005 ELECTROCARDIOGRAM TRACING: CPT

## 2020-08-07 PROCEDURE — 85025 COMPLETE CBC W/AUTO DIFF WBC: CPT

## 2020-08-07 RX ORDER — PANTOPRAZOLE SODIUM 20 MG/1
20 TABLET, DELAYED RELEASE ORAL DAILY
Qty: 30 TABLET | Refills: 0 | OUTPATIENT
Start: 2020-08-07 | End: 2021-07-15

## 2020-08-07 RX ORDER — DIAZEPAM 5 MG/1
5 TABLET ORAL
Status: COMPLETED | OUTPATIENT
Start: 2020-08-07 | End: 2020-08-07

## 2020-08-07 RX ORDER — ASPIRIN 325 MG
325 TABLET ORAL
Status: COMPLETED | OUTPATIENT
Start: 2020-08-07 | End: 2020-08-07

## 2020-08-07 RX ORDER — SUCRALFATE 1 G/1
1 TABLET ORAL 4 TIMES DAILY
Qty: 120 TABLET | Refills: 0 | Status: SHIPPED | OUTPATIENT
Start: 2020-08-07 | End: 2020-09-06

## 2020-08-07 RX ADMIN — DIAZEPAM 5 MG: 5 TABLET ORAL at 07:08

## 2020-08-07 RX ADMIN — LIDOCAINE HYDROCHLORIDE: 20 SOLUTION ORAL; TOPICAL at 07:08

## 2020-08-07 RX ADMIN — ASPIRIN 325 MG ORAL TABLET 325 MG: 325 PILL ORAL at 07:08

## 2020-08-07 NOTE — FIRST PROVIDER EVALUATION
Emergency Department TeleTRIAGE Encounter Note      CHIEF COMPLAINT    Chief Complaint   Patient presents with    Chest Pain     all symptoms started x 1 week    ingestion       VITAL SIGNS   Initial Vitals [08/07/20 1641]   BP Pulse Resp Temp SpO2   136/86 79 18 98.3 °F (36.8 °C) 99 %      MAP       --            ALLERGIES    Review of patient's allergies indicates:   Allergen Reactions    Percocet [oxycodone-acetaminophen]        PROVIDER TRIAGE NOTE  This is a teletriage evaluation of a 35 y.o. female presenting to the ED with c/o chest pain. Seen at [facility] on 8/3 2020 for similar. Initial orders will be placed and care will be transferred to an alternate provider when patient is roomed for a full evaluation. Any additional orders and the final disposition will be determined by that provider.         ORDERS  Labs Reviewed   POCT GLUCOSE MONITORING CONTINUOUS       ED Orders (720h ago, onward)    Start Ordered     Status Ordering Provider    08/07/20 1648 08/07/20 1648  POCT glucose  Once      Ordered JIMMY NEWTON    08/07/20 1648 08/07/20 1648  X-Ray Chest AP Portable  1 time imaging      Ordered JIMMY NEWTON    08/07/20 1645 08/07/20 1645  EKG 12-lead  Once      Ordered EZEKIEL BECKER            Virtual Visit Note: The provider triage portion of this emergency department evaluation and documentation was performed via [employer], a HIPAA-compliant telemedicine application, in concert with a tele-presenter in the room. A face to face patient evaluation with one of my colleagues will occur once the patient is placed in an emergency department room.      DISCLAIMER: This note was prepared with "[employer] voice recognition transcription software. Garbled syntax, mangled pronouns, and other bizarre constructions may be attributed to that software system.

## 2020-08-07 NOTE — Clinical Note
"Jordana"Ray Davis was seen and treated in our emergency department on 8/7/2020.     COVID-19 is present in our communities across the state. There is limited testing for COVID at this time, so not all patients can be tested. In this situation, your employee meets the following criteria:    Jordana Davis has expressed a desire/need to be tested for the COVID-19 virus but has none of the symptoms that one would associate with COVID-19. In the absence of symptoms, the patient does NOT meet the criteria for testing and should proceed with their work schedule as planned, following the routine infection control policies of the employer, as well as good hand hygiene.      If you have any questions or concerns, or if I can be of further assistance, please do not hesitate to contact me.    Sincerely,             Ted Hdz MD"

## 2020-08-08 NOTE — ED PROVIDER NOTES
Encounter Date: 8/7/2020       History     Chief Complaint   Patient presents with    Chest Pain     all symptoms started x 1 week    ingestion     36yo F smoker with pmh anxiety, hiatal hernia, arrhythmia?, cardiac abnormality, with chief complaint 8 day hx constant midsternal CP, described as burning pain, with radiation to L shoulder and posterior neck.  Pain is constant burning pain, sometimes sharp.  Patient states she was recently seen by her primary due to this complaint, her EKG had some abnormality so she was immediately sent to [facility].  Presentation suspicious for GERD versus muscle related issue, was discharged with Robaxin and asked to continue Pepcid.  She denies relief with Pepcid.  Patient admits to feeling fatigued, anxious, unable to sleep due to discomfort.  She states pain is not worsened with lying recumbent.  No pain associated with meals.  No history of any gallbladder issues.  No cough, no shortness of breath, no dyspnea on exertion.  No recent illness or sick contacts.  Pain is pleuritic, worsened with deep inspiration.  She denies any diaphoresis, no lightheadedness or dizziness, no syncope or near-syncope, no orthopnea, no paroxysmal nocturnal dyspnea, no lower extremity edema.    She denies personal history of myocardial infarction.  Strong family history of cardiac disease.  No hypertension, no hyperlipidemia, no DM, no obesity.    Patient has an appointment with her cardiologist on the 18th, an appointment with GI later this month.    Patient states she lifts things at work.    Hysterectomy, no longer on any estrogen contraception.  No history of VTE.  No unilateral leg swelling or calf pain.    Cardiologist:  Dr. Dumont of [facility].        Review of patient's allergies indicates:   Allergen Reactions    Percocet [oxycodone-acetaminophen]      Past Medical History:   Diagnosis Date    Anxiety     Hiatal hernia     Irregular heart rate     Ovarian cyst     Scoliosis      Past  Surgical History:   Procedure Laterality Date    DILATION AND CURETTAGE OF UTERUS      PARTIAL HYSTERECTOMY      TUBAL LIGATION       No family history on file.  Social History     Tobacco Use    Smoking status: Current Every Day Smoker     Packs/day: 1.00     Types: Cigarettes    Smokeless tobacco: Never Used   Substance Use Topics    Alcohol use: Yes     Comment: OCCASIONAL    Drug use: Yes     Types: Marijuana     Review of Systems   Constitutional: Positive for appetite change and fatigue. Negative for fever.   Respiratory: Negative for cough, chest tightness and shortness of breath.    Cardiovascular: Positive for chest pain. Negative for palpitations and leg swelling.   Gastrointestinal: Negative for abdominal pain, constipation, diarrhea, nausea and vomiting.   Genitourinary: Negative for dysuria, flank pain and frequency.   Musculoskeletal: Positive for back pain and neck pain. Negative for myalgias and neck stiffness.   Skin: Negative for rash.   Neurological: Negative for dizziness, syncope, weakness, light-headedness, numbness and headaches.       Physical Exam     Initial Vitals [08/07/20 1641]   BP Pulse Resp Temp SpO2   136/86 79 18 98.3 °F (36.8 °C) 99 %      MAP       --         Physical Exam    Nursing note and vitals reviewed.  Constitutional: She appears well-developed and well-nourished. She is not diaphoretic. No distress.   Well-appearing and nontoxic, resting comfortably on exam table, speaking in full sentences without pause or difficulty.   HENT:   Head: Normocephalic and atraumatic.   Eyes: EOM are normal.   Neck: Normal range of motion. Neck supple.   TTP bilateral upper trapezius without midline spinal tenderness.   Cardiovascular: Intact distal pulses.   Normal sinus rhythm, 92 beats per minute by palpation.  No pretibial edema, no unilateral leg swelling or calf tenderness.   Pulmonary/Chest: Breath sounds normal. No respiratory distress.   There is mild TTP to the lower  midsternum.   Abdominal: Soft. Bowel sounds are normal.   There is epigastric tenderness.  No rebound or guarding.   Musculoskeletal: Normal range of motion.   Neurological: She is alert and oriented to person, place, and time.   Skin: Skin is warm.   Psychiatric:   Anxious         ED Course   Procedures  Labs Reviewed   CBC W/ AUTO DIFFERENTIAL - Abnormal; Notable for the following components:       Result Value    Mean Corpuscular Volume 100 (*)     Mean Corpuscular Hemoglobin 33.6 (*)     RDW 11.4 (*)     All other components within normal limits   COMPREHENSIVE METABOLIC PANEL   TROPONIN I   POCT GLUCOSE     EKG Readings: (Independently Interpreted)   Sinus tachycardia, ventricular rate 100 beats per minute.  Possibly inverted T-wave versus flattening in V2.  No evidence of acute ischemia, arrhythmia, heart block.  No ST elevation.  No significant change from previous dated 06/29/2020.     ECG Results          EKG 12-lead (In process)  Result time 08/08/20 13:43:22    In process by Interface, Lab In [facility] (08/08/20 13:43:22)                 Narrative:    Test Reason : R07.9,    Vent. Rate : 100 BPM     Atrial Rate : 100 BPM     P-R Int : 164 ms          QRS Dur : 074 ms      QT Int : 332 ms       P-R-T Axes : 074 023 036 degrees     QTc Int : 428 ms    Normal sinus rhythm  Cannot rule out Anterior infarct ,age undetermined  Abnormal ECG  When compared with ECG of 29-JUN-2020 08:40,  No significant change was found    Referred By: [email or web address]   SELF           Confirmed By:                             Imaging Results          X-Ray Chest AP Portable (Final result)  Result time 08/07/20 19:51:21    Final result by Luna Loyd MD (08/07/20 19:51:21)                 Impression:      No acute intrathoracic abnormality detected.      Electronically signed by: Luna Loyd  Date:    08/07/2020  Time:    19:51             Narrative:    EXAMINATION:  AP PORTABLE CHEST    CLINICAL HISTORY:  Chest pain,  unspecified    TECHNIQUE:  AP portable chest radiograph was submitted.    COMPARISON:  06/29/2020    FINDINGS:  AP portable chest radiograph demonstrates a cardiac silhouette within normal limits.  There is no focal consolidation, pneumothorax, or pleural effusion.                                 Medical Decision Making:   Differential Diagnosis:   GERD, gastritis, myocardial infarction, PE  ED Management:  On re-evaluation, symptoms have resolved.  I think this is likely gastric.  EKG and cardiac labs reassuring.  Reassuring HEART score.  PERC negative.  She is not coming GI and cardiology appointment.  Anxiety is improved with Valium.  She has a prescription for Valium at home, asked her to use this for her neck discomfort, for her anxiety.  Protonix and Carafate.  GERD diet.  Return precautions given.    Additional MDM:   PERC Rule:   Age is greater than or equal to 50 = 0.0  Heart Rate is greater than or equal to 100 = 0.0  SaO2 on room air < 95% = 0.0  Unilateral leg swelling = 0.0  Hemoptysis = 0.0  Recent surgery or trauma = 0.0  Prior PE or DVT =  0.0  Hormone use = 0.00  PERC Score = 0  Heart Score:    History:          Slightly suspicious.  ECG:             Normal  Age:               Less than 45 years  Risk factors: 1-2 risk factors  Troponin:       Less than or equal to normal limit  Final Score: 1                                  Clinical Impression:       ICD-10-CM ICD-9-CM   1. Chest pain  R07.9 786.50             ED Disposition Condition    Discharge Stable        ED Prescriptions     Medication Sig Dispense Start Date End Date Auth. Provider    pantoprazole (PROTONIX) 20 MG tablet Take 1 tablet (20 mg total) by mouth once daily. 30 tablet 8/7/2020 8/7/2021 Inocencio Agrawal PA-C    sucralfate (CARAFATE) 1 gram tablet Take 1 tablet (1 g total) by mouth 4 (four) times daily. 120 tablet 8/7/2020 9/6/2020 Inocencio Agrawal PA-C        Follow-up Information     Follow up With Specialties Details Why  Contact Info    Jerson Strange MD General Practice Schedule an appointment as soon as possible for a visit  For reevaluation [address] [facility]  [place] [address]  [phone]      Gastroenterology  Go to  For reevaluation     Ronnie Dumont MD Cardiovascular Disease, Cardiology Go to  For reevaluation [address] [facility] [address], [address]  [facility] [id number] [phone]      [facility] Emergency Medicine  As needed, If symptoms worsen [address] [place], [place] [address] [address] [phone]                                     Inocencio Agrawal PA-C  08/08/20 2468

## 2020-08-08 NOTE — DISCHARGE INSTRUCTIONS
Continue with Valium as needed for any muscle soreness, as needed for any anxiety.  Protonix daily, Carafate 4 times daily.  Keep your Cardiology and your GI appointments.  Discontinue Pepcid for the time being.    Please return if symptoms persist or worsen, unable to eat or drink, if uncontrolled vomiting, if any other problems occur.

## 2021-01-25 ENCOUNTER — HOSPITAL ENCOUNTER (EMERGENCY)
Facility: HOSPITAL | Age: 36
Discharge: HOME | End: 2021-01-25
Attending: EMERGENCY MEDICINE
Payer: MEDICAID

## 2021-01-25 VITALS
WEIGHT: 117 LBS | BODY MASS INDEX: 21.53 KG/M2 | SYSTOLIC BLOOD PRESSURE: 132 MMHG | OXYGEN SATURATION: 100 % | DIASTOLIC BLOOD PRESSURE: 85 MMHG | RESPIRATION RATE: 18 BRPM | TEMPERATURE: 99 F | HEIGHT: 62 IN | HEART RATE: 74 BPM

## 2021-01-25 DIAGNOSIS — M54.2 NECK PAIN: ICD-10-CM

## 2021-01-25 DIAGNOSIS — R07.89 CHEST WALL PAIN: ICD-10-CM

## 2021-01-25 DIAGNOSIS — Z20.822 SUSPECTED COVID-19 VIRUS INFECTION: Primary | ICD-10-CM

## 2021-01-25 DIAGNOSIS — F41.9 ANXIETY: ICD-10-CM

## 2021-01-25 LAB
INFLUENZA A ANTIGEN, POC: NEGATIVE
INFLUENZA B ANTIGEN, POC: NEGATIVE

## 2021-01-25 PROCEDURE — 99284 EMERGENCY DEPT VISIT MOD MDM: CPT | Mod: 25,ER

## 2021-01-25 PROCEDURE — U0003 INFECTIOUS AGENT DETECTION BY NUCLEIC ACID (DNA OR RNA); SEVERE ACUTE RESPIRATORY SYNDROME CORONAVIRUS 2 (SARS-COV-2) (CORONAVIRUS DISEASE [COVID-19]), AMPLIFIED PROBE TECHNIQUE, MAKING USE OF HIGH THROUGHPUT TECHNOLOGIES AS DESCRIBED BY CMS-2020-01-R: HCPCS

## 2021-01-25 PROCEDURE — 87804 INFLUENZA ASSAY W/OPTIC: CPT | Mod: ER

## 2021-01-25 RX ORDER — DICLOFENAC SODIUM 10 MG/G
2 GEL TOPICAL 4 TIMES DAILY
Qty: 100 G | Refills: 0 | Status: SHIPPED | OUTPATIENT
Start: 2021-01-25

## 2021-01-25 RX ORDER — DEXTROMETHORPHAN HYDROBROMIDE, GUAIFENESIN 5; 100 MG/5ML; MG/5ML
650 LIQUID ORAL EVERY 8 HOURS
Qty: 20 TABLET | Refills: 0 | OUTPATIENT
Start: 2021-01-25 | End: 2021-03-24

## 2021-01-26 LAB — SARS-COV-2 RNA RESP QL NAA+PROBE: NOT DETECTED

## 2021-03-24 ENCOUNTER — HOSPITAL ENCOUNTER (EMERGENCY)
Facility: HOSPITAL | Age: 36
Discharge: HOME | End: 2021-03-24
Attending: EMERGENCY MEDICINE
Payer: MEDICAID

## 2021-03-24 VITALS
TEMPERATURE: 99 F | HEIGHT: 62 IN | WEIGHT: 115 LBS | DIASTOLIC BLOOD PRESSURE: 83 MMHG | SYSTOLIC BLOOD PRESSURE: 116 MMHG | BODY MASS INDEX: 21.16 KG/M2 | OXYGEN SATURATION: 100 % | HEART RATE: 75 BPM | RESPIRATION RATE: 19 BRPM

## 2021-03-24 DIAGNOSIS — B34.9 VIRAL SYNDROME: ICD-10-CM

## 2021-03-24 DIAGNOSIS — K52.9 GASTROENTERITIS: Primary | ICD-10-CM

## 2021-03-24 DIAGNOSIS — R07.9 CHEST PAIN, UNSPECIFIED TYPE: ICD-10-CM

## 2021-03-24 DIAGNOSIS — M54.9 BACK PAIN: ICD-10-CM

## 2021-03-24 DIAGNOSIS — Z20.822 SUSPECTED COVID-19 VIRUS INFECTION: ICD-10-CM

## 2021-03-24 LAB
ALBUMIN SERPL-MCNC: 4.1 G/DL (ref 3.3–5.5)
ALBUMIN SERPL-MCNC: 4.2 G/DL (ref 3.3–5.5)
ALP SERPL-CCNC: 61 U/L (ref 42–141)
ALP SERPL-CCNC: 61 U/L (ref 42–141)
BILIRUB SERPL-MCNC: 0.8 MG/DL (ref 0.2–1.6)
BILIRUB SERPL-MCNC: 0.9 MG/DL (ref 0.2–1.6)
BUN SERPL-MCNC: 10 MG/DL (ref 7–22)
CALCIUM SERPL-MCNC: 10.3 MG/DL (ref 8–10.3)
CHLORIDE SERPL-SCNC: 106 MMOL/L (ref 98–108)
CREAT SERPL-MCNC: 0.7 MG/DL (ref 0.6–1.2)
CTP QC/QA: YES
GLUCOSE SERPL-MCNC: 97 MG/DL (ref 73–118)
INFLUENZA A ANTIGEN, POC: NEGATIVE
INFLUENZA B ANTIGEN, POC: NEGATIVE
POC ALT (SGPT): 11 U/L (ref 10–47)
POC ALT (SGPT): 14 U/L (ref 10–47)
POC AMYLASE: 48 U/L (ref 14–97)
POC AST (SGOT): 26 U/L (ref 11–38)
POC AST (SGOT): 27 U/L (ref 11–38)
POC B-TYPE NATRIURETIC PEPTIDE: 5.8 PG/ML (ref 0–100)
POC CARDIAC TROPONIN I: 0 NG/ML
POC D-DI: <100 NG/ML (ref 0–450)
POC GGT: 20 U/L (ref 5–65)
POC TCO2: 27 MMOL/L (ref 18–33)
POTASSIUM BLD-SCNC: 4.3 MMOL/L (ref 3.6–5.1)
PROTEIN, POC: 7.4 G/DL (ref 6.4–8.1)
PROTEIN, POC: 7.5 G/DL (ref 6.4–8.1)
SAMPLE: NORMAL
SARS-COV-2 RDRP RESP QL NAA+PROBE: NEGATIVE
SODIUM BLD-SCNC: 144 MMOL/L (ref 128–145)

## 2021-03-24 PROCEDURE — 63600175 PHARM REV CODE 636 W HCPCS: Mod: ER | Performed by: EMERGENCY MEDICINE

## 2021-03-24 PROCEDURE — 96375 TX/PRO/DX INJ NEW DRUG ADDON: CPT | Mod: ER

## 2021-03-24 PROCEDURE — 96374 THER/PROPH/DIAG INJ IV PUSH: CPT | Mod: ER

## 2021-03-24 PROCEDURE — 84484 ASSAY OF TROPONIN QUANT: CPT | Mod: ER

## 2021-03-24 PROCEDURE — 87804 INFLUENZA ASSAY W/OPTIC: CPT | Mod: 59,ER

## 2021-03-24 PROCEDURE — 93010 EKG 12-LEAD: ICD-10-PCS | Mod: ,,, | Performed by: INTERNAL MEDICINE

## 2021-03-24 PROCEDURE — 85025 COMPLETE CBC W/AUTO DIFF WBC: CPT | Mod: ER

## 2021-03-24 PROCEDURE — 80053 COMPREHEN METABOLIC PANEL: CPT | Mod: ER

## 2021-03-24 PROCEDURE — 25000003 PHARM REV CODE 250: Mod: ER | Performed by: EMERGENCY MEDICINE

## 2021-03-24 PROCEDURE — U0003 INFECTIOUS AGENT DETECTION BY NUCLEIC ACID (DNA OR RNA); SEVERE ACUTE RESPIRATORY SYNDROME CORONAVIRUS 2 (SARS-COV-2) (CORONAVIRUS DISEASE [COVID-19]), AMPLIFIED PROBE TECHNIQUE, MAKING USE OF HIGH THROUGHPUT TECHNOLOGIES AS DESCRIBED BY CMS-2020-01-R: HCPCS | Performed by: EMERGENCY MEDICINE

## 2021-03-24 PROCEDURE — 99285 EMERGENCY DEPT VISIT HI MDM: CPT | Mod: 25,ER

## 2021-03-24 PROCEDURE — U0005 INFEC AGEN DETEC AMPLI PROBE: HCPCS | Performed by: EMERGENCY MEDICINE

## 2021-03-24 PROCEDURE — 93005 ELECTROCARDIOGRAM TRACING: CPT | Mod: ER

## 2021-03-24 PROCEDURE — 83880 ASSAY OF NATRIURETIC PEPTIDE: CPT | Mod: ER

## 2021-03-24 PROCEDURE — 93010 ELECTROCARDIOGRAM REPORT: CPT | Mod: ,,, | Performed by: INTERNAL MEDICINE

## 2021-03-24 PROCEDURE — 82150 ASSAY OF AMYLASE: CPT | Mod: ER

## 2021-03-24 PROCEDURE — 85379 FIBRIN DEGRADATION QUANT: CPT | Mod: ER

## 2021-03-24 PROCEDURE — U0002 COVID-19 LAB TEST NON-CDC: HCPCS | Mod: ER | Performed by: EMERGENCY MEDICINE

## 2021-03-24 RX ORDER — PROMETHAZINE HYDROCHLORIDE AND DEXTROMETHORPHAN HYDROBROMIDE 6.25; 15 MG/5ML; MG/5ML
5 SYRUP ORAL 3 TIMES DAILY PRN
Qty: 200 ML | Refills: 0 | Status: SHIPPED | OUTPATIENT
Start: 2021-03-24 | End: 2021-04-03

## 2021-03-24 RX ORDER — PROMETHAZINE HYDROCHLORIDE 25 MG/1
25 SUPPOSITORY RECTAL EVERY 6 HOURS PRN
Qty: 10 SUPPOSITORY | Refills: 0 | OUTPATIENT
Start: 2021-03-24 | End: 2021-12-14

## 2021-03-24 RX ORDER — FAMOTIDINE 20 MG/1
20 TABLET, FILM COATED ORAL 2 TIMES DAILY
Qty: 20 TABLET | Refills: 0 | OUTPATIENT
Start: 2021-03-24 | End: 2021-07-15

## 2021-03-24 RX ORDER — FAMOTIDINE 10 MG/ML
20 INJECTION INTRAVENOUS
Status: COMPLETED | OUTPATIENT
Start: 2021-03-24 | End: 2021-03-24

## 2021-03-24 RX ORDER — ACETAMINOPHEN 500 MG
1000 TABLET ORAL EVERY 6 HOURS PRN
Qty: 30 TABLET | Refills: 0 | OUTPATIENT
Start: 2021-03-24 | End: 2021-10-21

## 2021-03-24 RX ORDER — ASPIRIN 325 MG
325 TABLET ORAL
Status: COMPLETED | OUTPATIENT
Start: 2021-03-24 | End: 2021-03-24

## 2021-03-24 RX ORDER — LORATADINE 10 MG/1
10 TABLET ORAL DAILY
Qty: 60 TABLET | Refills: 0 | OUTPATIENT
Start: 2021-03-24 | End: 2021-12-14

## 2021-03-24 RX ORDER — ONDANSETRON 2 MG/ML
4 INJECTION INTRAMUSCULAR; INTRAVENOUS
Status: COMPLETED | OUTPATIENT
Start: 2021-03-24 | End: 2021-03-24

## 2021-03-24 RX ORDER — KETOROLAC TROMETHAMINE 30 MG/ML
15 INJECTION, SOLUTION INTRAMUSCULAR; INTRAVENOUS
Status: COMPLETED | OUTPATIENT
Start: 2021-03-24 | End: 2021-03-24

## 2021-03-24 RX ORDER — ALBUTEROL SULFATE 90 UG/1
2 AEROSOL, METERED RESPIRATORY (INHALATION) EVERY 6 HOURS PRN
Qty: 18 G | Refills: 0 | Status: SHIPPED | OUTPATIENT
Start: 2021-03-24 | End: 2022-03-24

## 2021-03-24 RX ADMIN — ONDANSETRON 4 MG: 2 INJECTION INTRAMUSCULAR; INTRAVENOUS at 11:03

## 2021-03-24 RX ADMIN — KETOROLAC TROMETHAMINE 15 MG: 30 INJECTION, SOLUTION INTRAMUSCULAR; INTRAVENOUS at 11:03

## 2021-03-24 RX ADMIN — FAMOTIDINE 20 MG: 10 INJECTION INTRAVENOUS at 11:03

## 2021-03-24 RX ADMIN — LIDOCAINE HYDROCHLORIDE: 20 SOLUTION ORAL; TOPICAL at 11:03

## 2021-03-24 RX ADMIN — ASPIRIN 325 MG ORAL TABLET 325 MG: 325 PILL ORAL at 08:03

## 2021-03-25 LAB — SARS-COV-2 RNA RESP QL NAA+PROBE: NOT DETECTED

## 2021-04-15 ENCOUNTER — PATIENT MESSAGE (OUTPATIENT)
Dept: RESEARCH | Facility: HOSPITAL | Age: 36
End: 2021-04-15

## 2021-07-15 ENCOUNTER — HOSPITAL ENCOUNTER (EMERGENCY)
Facility: HOSPITAL | Age: 36
Discharge: HOME | End: 2021-07-15
Attending: EMERGENCY MEDICINE
Payer: MEDICAID

## 2021-07-15 VITALS
RESPIRATION RATE: 20 BRPM | SYSTOLIC BLOOD PRESSURE: 124 MMHG | HEART RATE: 68 BPM | OXYGEN SATURATION: 99 % | DIASTOLIC BLOOD PRESSURE: 78 MMHG | TEMPERATURE: 98 F

## 2021-07-15 DIAGNOSIS — K29.00 OTHER ACUTE GASTRITIS WITHOUT HEMORRHAGE: Primary | ICD-10-CM

## 2021-07-15 DIAGNOSIS — R07.9 CHEST PAIN: ICD-10-CM

## 2021-07-15 DIAGNOSIS — K20.90 ESOPHAGITIS: ICD-10-CM

## 2021-07-15 LAB
ALBUMIN SERPL-MCNC: 3.7 G/DL (ref 3.3–5.5)
ALBUMIN SERPL-MCNC: 3.7 G/DL (ref 3.3–5.5)
ALP SERPL-CCNC: 56 U/L (ref 42–141)
ALP SERPL-CCNC: 60 U/L (ref 42–141)
BILIRUB SERPL-MCNC: 0.8 MG/DL (ref 0.2–1.6)
BILIRUB SERPL-MCNC: 0.8 MG/DL (ref 0.2–1.6)
BUN SERPL-MCNC: 10 MG/DL (ref 7–22)
CALCIUM SERPL-MCNC: 8.8 MG/DL (ref 8–10.3)
CHLORIDE SERPL-SCNC: 105 MMOL/L (ref 98–108)
CREAT SERPL-MCNC: 0.6 MG/DL (ref 0.6–1.2)
GLUCOSE SERPL-MCNC: 94 MG/DL (ref 73–118)
POC ALT (SGPT): 15 U/L (ref 10–47)
POC ALT (SGPT): 20 U/L (ref 10–47)
POC AMYLASE: 34 U/L (ref 14–97)
POC AST (SGOT): 25 U/L (ref 11–38)
POC AST (SGOT): 25 U/L (ref 11–38)
POC CARDIAC TROPONIN I: 0 NG/ML
POC GGT: 16 U/L (ref 5–65)
POC TCO2: 30 MMOL/L (ref 18–33)
POTASSIUM BLD-SCNC: 4 MMOL/L (ref 3.6–5.1)
PROTEIN, POC: 6.2 G/DL (ref 6.4–8.1)
PROTEIN, POC: 6.4 G/DL (ref 6.4–8.1)
SAMPLE: NORMAL
SODIUM BLD-SCNC: 138 MMOL/L (ref 128–145)

## 2021-07-15 PROCEDURE — 93010 EKG 12-LEAD: ICD-10-PCS | Mod: ,,, | Performed by: INTERNAL MEDICINE

## 2021-07-15 PROCEDURE — 99285 EMERGENCY DEPT VISIT HI MDM: CPT | Mod: 25,ER

## 2021-07-15 PROCEDURE — 96360 HYDRATION IV INFUSION INIT: CPT | Mod: ER

## 2021-07-15 PROCEDURE — 93005 ELECTROCARDIOGRAM TRACING: CPT | Mod: ER

## 2021-07-15 PROCEDURE — 93010 ELECTROCARDIOGRAM REPORT: CPT | Mod: ,,, | Performed by: INTERNAL MEDICINE

## 2021-07-15 PROCEDURE — 84484 ASSAY OF TROPONIN QUANT: CPT | Mod: ER

## 2021-07-15 PROCEDURE — 80053 COMPREHEN METABOLIC PANEL: CPT | Mod: ER

## 2021-07-15 PROCEDURE — 82150 ASSAY OF AMYLASE: CPT | Mod: ER

## 2021-07-15 PROCEDURE — 85025 COMPLETE CBC W/AUTO DIFF WBC: CPT | Mod: ER

## 2021-07-15 PROCEDURE — 25000003 PHARM REV CODE 250: Mod: ER | Performed by: EMERGENCY MEDICINE

## 2021-07-15 RX ORDER — FAMOTIDINE 20 MG/1
20 TABLET, FILM COATED ORAL 2 TIMES DAILY
Qty: 60 TABLET | Refills: 2 | Status: SHIPPED | OUTPATIENT
Start: 2021-07-15 | End: 2023-09-18 | Stop reason: ALTCHOICE

## 2021-07-15 RX ORDER — PANTOPRAZOLE SODIUM 20 MG/1
20 TABLET, DELAYED RELEASE ORAL DAILY
Qty: 30 TABLET | Refills: 2 | Status: SHIPPED | OUTPATIENT
Start: 2021-07-15 | End: 2021-07-15 | Stop reason: SDUPTHER

## 2021-07-15 RX ORDER — PANTOPRAZOLE SODIUM 40 MG/1
40 TABLET, DELAYED RELEASE ORAL DAILY
Qty: 30 TABLET | Refills: 2 | OUTPATIENT
Start: 2021-07-15 | End: 2021-12-14

## 2021-07-15 RX ORDER — FAMOTIDINE 20 MG/1
20 TABLET, FILM COATED ORAL 2 TIMES DAILY
Qty: 60 TABLET | Refills: 2 | Status: SHIPPED | OUTPATIENT
Start: 2021-07-15 | End: 2021-07-15 | Stop reason: SDUPTHER

## 2021-07-15 RX ADMIN — SODIUM CHLORIDE 1000 ML: 0.9 INJECTION, SOLUTION INTRAVENOUS at 08:07

## 2021-07-15 RX ADMIN — MAGNESIUM HYDROXIDE/ALUMINUM HYDROXICE/SIMETHICONE: 120; 1200; 1200 SUSPENSION ORAL at 08:07

## 2021-10-08 ENCOUNTER — HOSPITAL ENCOUNTER (EMERGENCY)
Facility: HOSPITAL | Age: 36
Discharge: HOME | End: 2021-10-08
Attending: EMERGENCY MEDICINE
Payer: MEDICAID

## 2021-10-08 VITALS
WEIGHT: 121 LBS | TEMPERATURE: 99 F | HEART RATE: 79 BPM | BODY MASS INDEX: 22.26 KG/M2 | HEIGHT: 62 IN | OXYGEN SATURATION: 99 % | SYSTOLIC BLOOD PRESSURE: 112 MMHG | DIASTOLIC BLOOD PRESSURE: 69 MMHG | RESPIRATION RATE: 18 BRPM

## 2021-10-08 DIAGNOSIS — M54.9 UPPER BACK PAIN: ICD-10-CM

## 2021-10-08 DIAGNOSIS — M79.605 LEFT LEG PAIN: ICD-10-CM

## 2021-10-08 DIAGNOSIS — R07.9 CHEST PAIN: Primary | ICD-10-CM

## 2021-10-08 DIAGNOSIS — F41.9 ANXIETY: ICD-10-CM

## 2021-10-08 LAB
ALBUMIN SERPL-MCNC: 3.9 G/DL (ref 3.3–5.5)
ALP SERPL-CCNC: 67 U/L (ref 42–141)
B-HCG UR QL: NEGATIVE
BILIRUB SERPL-MCNC: 0.6 MG/DL (ref 0.2–1.6)
BUN SERPL-MCNC: 8 MG/DL (ref 7–22)
CALCIUM SERPL-MCNC: 9.4 MG/DL (ref 8–10.3)
CHLORIDE SERPL-SCNC: 102 MMOL/L (ref 98–108)
CREAT SERPL-MCNC: 0.6 MG/DL (ref 0.6–1.2)
CTP QC/QA: YES
GLUCOSE SERPL-MCNC: 91 MG/DL (ref 73–118)
POC ALT (SGPT): 20 U/L (ref 10–47)
POC AST (SGOT): 30 U/L (ref 11–38)
POC B-TYPE NATRIURETIC PEPTIDE: <5 PG/ML (ref 0–100)
POC CARDIAC TROPONIN I: 0 NG/ML
POC D-DI: <100 NG/ML (ref 0–450)
POC TCO2: 27 MMOL/L (ref 18–33)
POTASSIUM BLD-SCNC: 3.8 MMOL/L (ref 3.6–5.1)
PROTEIN, POC: 6.6 G/DL (ref 6.4–8.1)
SAMPLE: NORMAL
SODIUM BLD-SCNC: 139 MMOL/L (ref 128–145)

## 2021-10-08 PROCEDURE — 80053 COMPREHEN METABOLIC PANEL: CPT | Mod: ER

## 2021-10-08 PROCEDURE — 85025 COMPLETE CBC W/AUTO DIFF WBC: CPT | Mod: ER

## 2021-10-08 PROCEDURE — 96374 THER/PROPH/DIAG INJ IV PUSH: CPT | Mod: ER

## 2021-10-08 PROCEDURE — 83880 ASSAY OF NATRIURETIC PEPTIDE: CPT | Mod: ER

## 2021-10-08 PROCEDURE — 63600175 PHARM REV CODE 636 W HCPCS: Mod: ER | Performed by: EMERGENCY MEDICINE

## 2021-10-08 PROCEDURE — 93005 ELECTROCARDIOGRAM TRACING: CPT | Mod: ER

## 2021-10-08 PROCEDURE — 25000003 PHARM REV CODE 250: Mod: ER | Performed by: EMERGENCY MEDICINE

## 2021-10-08 PROCEDURE — 81025 URINE PREGNANCY TEST: CPT | Mod: ER | Performed by: NURSE PRACTITIONER

## 2021-10-08 PROCEDURE — 93010 EKG 12-LEAD: ICD-10-PCS | Mod: ,,, | Performed by: INTERNAL MEDICINE

## 2021-10-08 PROCEDURE — 85379 FIBRIN DEGRADATION QUANT: CPT | Mod: ER

## 2021-10-08 PROCEDURE — 99285 EMERGENCY DEPT VISIT HI MDM: CPT | Mod: 25,ER

## 2021-10-08 PROCEDURE — 93010 ELECTROCARDIOGRAM REPORT: CPT | Mod: ,,, | Performed by: INTERNAL MEDICINE

## 2021-10-08 PROCEDURE — 84484 ASSAY OF TROPONIN QUANT: CPT | Mod: ER

## 2021-10-08 RX ORDER — CYCLOBENZAPRINE HCL 10 MG
10 TABLET ORAL
Status: COMPLETED | OUTPATIENT
Start: 2021-10-08 | End: 2021-10-08

## 2021-10-08 RX ORDER — HYDROXYZINE HYDROCHLORIDE 25 MG/1
25 TABLET, FILM COATED ORAL 3 TIMES DAILY PRN
Qty: 12 TABLET | Refills: 0 | Status: SHIPPED | OUTPATIENT
Start: 2021-10-08

## 2021-10-08 RX ORDER — KETOROLAC TROMETHAMINE 30 MG/ML
15 INJECTION, SOLUTION INTRAMUSCULAR; INTRAVENOUS
Status: COMPLETED | OUTPATIENT
Start: 2021-10-08 | End: 2021-10-08

## 2021-10-08 RX ORDER — NAPROXEN 500 MG/1
500 TABLET ORAL 2 TIMES DAILY WITH MEALS
Qty: 60 TABLET | Refills: 0 | OUTPATIENT
Start: 2021-10-08 | End: 2022-01-02

## 2021-10-08 RX ORDER — TIZANIDINE 4 MG/1
4 TABLET ORAL EVERY 6 HOURS PRN
Qty: 20 TABLET | Refills: 0 | Status: SHIPPED | OUTPATIENT
Start: 2021-10-08 | End: 2021-10-18

## 2021-10-08 RX ADMIN — CYCLOBENZAPRINE 10 MG: 10 TABLET, FILM COATED ORAL at 12:10

## 2021-10-08 RX ADMIN — KETOROLAC TROMETHAMINE 15 MG: 30 INJECTION, SOLUTION INTRAMUSCULAR at 12:10

## 2021-10-21 ENCOUNTER — HOSPITAL ENCOUNTER (EMERGENCY)
Facility: HOSPITAL | Age: 36
Discharge: HOME | End: 2021-10-21
Attending: EMERGENCY MEDICINE
Payer: MEDICAID

## 2021-10-21 VITALS
BODY MASS INDEX: 22.08 KG/M2 | SYSTOLIC BLOOD PRESSURE: 121 MMHG | RESPIRATION RATE: 20 BRPM | TEMPERATURE: 98 F | OXYGEN SATURATION: 98 % | HEART RATE: 71 BPM | WEIGHT: 120 LBS | DIASTOLIC BLOOD PRESSURE: 62 MMHG | HEIGHT: 62 IN

## 2021-10-21 DIAGNOSIS — R06.02 SOB (SHORTNESS OF BREATH): ICD-10-CM

## 2021-10-21 DIAGNOSIS — R07.9 CHEST PAIN: ICD-10-CM

## 2021-10-21 LAB
ALBUMIN SERPL BCP-MCNC: 4.8 G/DL (ref 3.5–5.2)
ALP SERPL-CCNC: 68 U/L (ref 55–135)
ALT SERPL W/O P-5'-P-CCNC: 16 U/L (ref 10–44)
ANION GAP SERPL CALC-SCNC: 8 MMOL/L (ref 8–16)
AST SERPL-CCNC: 16 U/L (ref 10–40)
BASOPHILS # BLD AUTO: 0.06 K/UL (ref 0–0.2)
BASOPHILS NFR BLD: 0.8 % (ref 0–1.9)
BILIRUB SERPL-MCNC: 0.5 MG/DL (ref 0.1–1)
BILIRUB UR QL STRIP: NEGATIVE
BNP SERPL-MCNC: <10 PG/ML (ref 0–99)
BUN SERPL-MCNC: 8 MG/DL (ref 6–20)
CALCIUM SERPL-MCNC: 10.6 MG/DL (ref 8.7–10.5)
CHLORIDE SERPL-SCNC: 104 MMOL/L (ref 95–110)
CLARITY UR: CLEAR
CO2 SERPL-SCNC: 26 MMOL/L (ref 23–29)
COLOR UR: COLORLESS
CREAT SERPL-MCNC: 0.7 MG/DL (ref 0.5–1.4)
CTP QC/QA: YES
DIFFERENTIAL METHOD: ABNORMAL
EOSINOPHIL # BLD AUTO: 0 K/UL (ref 0–0.5)
EOSINOPHIL NFR BLD: 0.3 % (ref 0–8)
ERYTHROCYTE [DISTWIDTH] IN BLOOD BY AUTOMATED COUNT: 11.7 % (ref 11.5–14.5)
EST. GFR  (AFRICAN AMERICAN): >60 ML/MIN/1.73 M^2
EST. GFR  (NON AFRICAN AMERICAN): >60 ML/MIN/1.73 M^2
GLUCOSE SERPL-MCNC: 92 MG/DL (ref 70–110)
GLUCOSE SERPL-MCNC: 97 MG/DL (ref 70–110)
GLUCOSE UR QL STRIP: NEGATIVE
HCT VFR BLD AUTO: 45.1 % (ref 37–48.5)
HGB BLD-MCNC: 15.3 G/DL (ref 12–16)
HGB UR QL STRIP: NEGATIVE
IMM GRANULOCYTES # BLD AUTO: 0.03 K/UL (ref 0–0.04)
IMM GRANULOCYTES NFR BLD AUTO: 0.4 % (ref 0–0.5)
KETONES UR QL STRIP: NEGATIVE
LEUKOCYTE ESTERASE UR QL STRIP: NEGATIVE
LYMPHOCYTES # BLD AUTO: 1.4 K/UL (ref 1–4.8)
LYMPHOCYTES NFR BLD: 18.3 % (ref 18–48)
MCH RBC QN AUTO: 33.3 PG (ref 27–31)
MCHC RBC AUTO-ENTMCNC: 33.9 G/DL (ref 32–36)
MCV RBC AUTO: 98 FL (ref 82–98)
MONOCYTES # BLD AUTO: 0.5 K/UL (ref 0.3–1)
MONOCYTES NFR BLD: 6.2 % (ref 4–15)
NEUTROPHILS # BLD AUTO: 5.5 K/UL (ref 1.8–7.7)
NEUTROPHILS NFR BLD: 74 % (ref 38–73)
NITRITE UR QL STRIP: NEGATIVE
NRBC BLD-RTO: 0 /100 WBC
PH UR STRIP: 8 [PH] (ref 5–8)
PLATELET # BLD AUTO: 294 K/UL (ref 150–450)
PMV BLD AUTO: 9.6 FL (ref 9.2–12.9)
POCT GLUCOSE: 97 MG/DL (ref 70–110)
POTASSIUM SERPL-SCNC: 4.4 MMOL/L (ref 3.5–5.1)
PROT SERPL-MCNC: 7.8 G/DL (ref 6–8.4)
PROT UR QL STRIP: NEGATIVE
RBC # BLD AUTO: 4.6 M/UL (ref 4–5.4)
SARS-COV-2 RDRP RESP QL NAA+PROBE: NEGATIVE
SODIUM SERPL-SCNC: 138 MMOL/L (ref 136–145)
SP GR UR STRIP: 1 (ref 1–1.03)
TROPONIN I SERPL DL<=0.01 NG/ML-MCNC: <0.006 NG/ML (ref 0–0.03)
URN SPEC COLLECT METH UR: ABNORMAL
UROBILINOGEN UR STRIP-ACNC: NEGATIVE EU/DL
WBC # BLD AUTO: 7.47 K/UL (ref 3.9–12.7)

## 2021-10-21 PROCEDURE — 82962 GLUCOSE BLOOD TEST: CPT

## 2021-10-21 PROCEDURE — 93010 ELECTROCARDIOGRAM REPORT: CPT | Mod: ,,, | Performed by: INTERNAL MEDICINE

## 2021-10-21 PROCEDURE — 96374 THER/PROPH/DIAG INJ IV PUSH: CPT

## 2021-10-21 PROCEDURE — 83880 ASSAY OF NATRIURETIC PEPTIDE: CPT | Performed by: PHYSICIAN ASSISTANT

## 2021-10-21 PROCEDURE — 93005 ELECTROCARDIOGRAM TRACING: CPT

## 2021-10-21 PROCEDURE — 96361 HYDRATE IV INFUSION ADD-ON: CPT

## 2021-10-21 PROCEDURE — 96375 TX/PRO/DX INJ NEW DRUG ADDON: CPT

## 2021-10-21 PROCEDURE — 99285 EMERGENCY DEPT VISIT HI MDM: CPT | Mod: 25

## 2021-10-21 PROCEDURE — 25000003 PHARM REV CODE 250: Performed by: PHYSICIAN ASSISTANT

## 2021-10-21 PROCEDURE — 80053 COMPREHEN METABOLIC PANEL: CPT | Performed by: PHYSICIAN ASSISTANT

## 2021-10-21 PROCEDURE — 84484 ASSAY OF TROPONIN QUANT: CPT | Performed by: PHYSICIAN ASSISTANT

## 2021-10-21 PROCEDURE — U0002 COVID-19 LAB TEST NON-CDC: HCPCS | Performed by: NURSE PRACTITIONER

## 2021-10-21 PROCEDURE — 93010 EKG 12-LEAD: ICD-10-PCS | Mod: ,,, | Performed by: INTERNAL MEDICINE

## 2021-10-21 PROCEDURE — 63600175 PHARM REV CODE 636 W HCPCS: Performed by: PHYSICIAN ASSISTANT

## 2021-10-21 PROCEDURE — 81003 URINALYSIS AUTO W/O SCOPE: CPT | Performed by: PHYSICIAN ASSISTANT

## 2021-10-21 PROCEDURE — 85025 COMPLETE CBC W/AUTO DIFF WBC: CPT | Performed by: PHYSICIAN ASSISTANT

## 2021-10-21 RX ORDER — CETIRIZINE HYDROCHLORIDE 10 MG/1
10 TABLET ORAL DAILY
Qty: 14 TABLET | Refills: 0 | Status: SHIPPED | OUTPATIENT
Start: 2021-10-21 | End: 2021-11-04

## 2021-10-21 RX ORDER — ONDANSETRON 4 MG/1
4 TABLET, ORALLY DISINTEGRATING ORAL EVERY 6 HOURS PRN
Qty: 15 TABLET | Refills: 0 | Status: SHIPPED | OUTPATIENT
Start: 2021-10-21 | End: 2021-10-26

## 2021-10-21 RX ORDER — PROCHLORPERAZINE EDISYLATE 5 MG/ML
5 INJECTION INTRAMUSCULAR; INTRAVENOUS
Status: COMPLETED | OUTPATIENT
Start: 2021-10-21 | End: 2021-10-21

## 2021-10-21 RX ORDER — ACETAMINOPHEN 500 MG
500 TABLET ORAL EVERY 4 HOURS PRN
Qty: 20 TABLET | Refills: 0 | Status: SHIPPED | OUTPATIENT
Start: 2021-10-21 | End: 2021-10-26

## 2021-10-21 RX ORDER — PROMETHAZINE HYDROCHLORIDE AND DEXTROMETHORPHAN HYDROBROMIDE 6.25; 15 MG/5ML; MG/5ML
5 SYRUP ORAL EVERY 4 HOURS PRN
Qty: 100 ML | Refills: 0 | Status: SHIPPED | OUTPATIENT
Start: 2021-10-21 | End: 2021-10-28

## 2021-10-21 RX ORDER — FAMOTIDINE 10 MG/ML
20 INJECTION INTRAVENOUS
Status: COMPLETED | OUTPATIENT
Start: 2021-10-21 | End: 2021-10-21

## 2021-10-21 RX ORDER — FLUTICASONE PROPIONATE 50 MCG
1 SPRAY, SUSPENSION (ML) NASAL 2 TIMES DAILY PRN
Qty: 15 G | Refills: 0 | Status: SHIPPED | OUTPATIENT
Start: 2021-10-21 | End: 2021-11-20

## 2021-10-21 RX ORDER — DIPHENHYDRAMINE HYDROCHLORIDE 50 MG/ML
25 INJECTION INTRAMUSCULAR; INTRAVENOUS
Status: COMPLETED | OUTPATIENT
Start: 2021-10-21 | End: 2021-10-21

## 2021-10-21 RX ORDER — IBUPROFEN 600 MG/1
600 TABLET ORAL EVERY 6 HOURS PRN
Qty: 20 TABLET | Refills: 0 | Status: SHIPPED | OUTPATIENT
Start: 2021-10-21 | End: 2021-10-26

## 2021-10-21 RX ORDER — KETOROLAC TROMETHAMINE 30 MG/ML
15 INJECTION, SOLUTION INTRAMUSCULAR; INTRAVENOUS
Status: COMPLETED | OUTPATIENT
Start: 2021-10-21 | End: 2021-10-21

## 2021-10-21 RX ORDER — BENZONATATE 100 MG/1
100 CAPSULE ORAL 3 TIMES DAILY PRN
Qty: 20 CAPSULE | Refills: 0 | Status: SHIPPED | OUTPATIENT
Start: 2021-10-21 | End: 2021-10-28

## 2021-10-21 RX ADMIN — PROCHLORPERAZINE EDISYLATE 5 MG: 5 INJECTION INTRAMUSCULAR; INTRAVENOUS at 11:10

## 2021-10-21 RX ADMIN — KETOROLAC TROMETHAMINE 15 MG: 30 INJECTION, SOLUTION INTRAMUSCULAR at 11:10

## 2021-10-21 RX ADMIN — FAMOTIDINE 20 MG: 10 INJECTION INTRAVENOUS at 11:10

## 2021-10-21 RX ADMIN — DIPHENHYDRAMINE HYDROCHLORIDE 25 MG: 50 INJECTION INTRAMUSCULAR; INTRAVENOUS at 11:10

## 2021-10-21 RX ADMIN — SODIUM CHLORIDE 1000 ML: 0.9 INJECTION, SOLUTION INTRAVENOUS at 11:10

## 2021-11-19 ENCOUNTER — HOSPITAL ENCOUNTER (EMERGENCY)
Facility: HOSPITAL | Age: 36
Discharge: HOME | End: 2021-11-19
Attending: EMERGENCY MEDICINE
Payer: MEDICAID

## 2021-11-19 VITALS
OXYGEN SATURATION: 98 % | HEART RATE: 90 BPM | BODY MASS INDEX: 22.08 KG/M2 | HEIGHT: 62 IN | TEMPERATURE: 99 F | WEIGHT: 120 LBS | DIASTOLIC BLOOD PRESSURE: 85 MMHG | SYSTOLIC BLOOD PRESSURE: 138 MMHG | RESPIRATION RATE: 18 BRPM

## 2021-11-19 DIAGNOSIS — M54.2 NECK PAIN: ICD-10-CM

## 2021-11-19 DIAGNOSIS — R51.9 FRONTAL HEADACHE: Primary | ICD-10-CM

## 2021-11-19 LAB
BILIRUBIN, POC UA: NEGATIVE
BLOOD, POC UA: ABNORMAL
CLARITY, POC UA: CLEAR
COLOR, POC UA: YELLOW
CTP QC/QA: YES
GLUCOSE, POC UA: NEGATIVE
KETONES, POC UA: NEGATIVE
LEUKOCYTE EST, POC UA: NEGATIVE
NITRITE, POC UA: NEGATIVE
PH UR STRIP: 6.5 [PH]
PROTEIN, POC UA: NEGATIVE
SARS-COV-2 RDRP RESP QL NAA+PROBE: NEGATIVE
SPECIFIC GRAVITY, POC UA: 1.02
UROBILINOGEN, POC UA: 0.2 E.U./DL

## 2021-11-19 PROCEDURE — 96361 HYDRATE IV INFUSION ADD-ON: CPT | Mod: ER

## 2021-11-19 PROCEDURE — 81003 URINALYSIS AUTO W/O SCOPE: CPT | Mod: ER

## 2021-11-19 PROCEDURE — 25000003 PHARM REV CODE 250: Mod: ER | Performed by: PHYSICIAN ASSISTANT

## 2021-11-19 PROCEDURE — U0002 COVID-19 LAB TEST NON-CDC: HCPCS | Mod: ER | Performed by: PHYSICIAN ASSISTANT

## 2021-11-19 PROCEDURE — 96374 THER/PROPH/DIAG INJ IV PUSH: CPT | Mod: ER

## 2021-11-19 PROCEDURE — 99284 EMERGENCY DEPT VISIT MOD MDM: CPT | Mod: 25,ER

## 2021-11-19 PROCEDURE — 63600175 PHARM REV CODE 636 W HCPCS: Mod: ER | Performed by: PHYSICIAN ASSISTANT

## 2021-11-19 PROCEDURE — 96375 TX/PRO/DX INJ NEW DRUG ADDON: CPT | Mod: ER

## 2021-11-19 RX ORDER — CYCLOBENZAPRINE HCL 10 MG
10 TABLET ORAL 3 TIMES DAILY PRN
Qty: 20 TABLET | Refills: 0 | Status: SHIPPED | OUTPATIENT
Start: 2021-11-19 | End: 2021-11-26

## 2021-11-19 RX ORDER — IBUPROFEN 600 MG/1
600 TABLET ORAL EVERY 6 HOURS PRN
Qty: 20 TABLET | Refills: 0 | Status: SHIPPED | OUTPATIENT
Start: 2021-11-19 | End: 2021-11-24

## 2021-11-19 RX ORDER — PROCHLORPERAZINE EDISYLATE 5 MG/ML
5 INJECTION INTRAMUSCULAR; INTRAVENOUS
Status: COMPLETED | OUTPATIENT
Start: 2021-11-19 | End: 2021-11-19

## 2021-11-19 RX ORDER — LIDOCAINE 50 MG/G
1 PATCH TOPICAL DAILY
Qty: 15 PATCH | Refills: 0 | Status: SHIPPED | OUTPATIENT
Start: 2021-11-19 | End: 2021-12-04

## 2021-11-19 RX ORDER — BUTALBITAL, ACETAMINOPHEN AND CAFFEINE 50; 325; 40 MG/1; MG/1; MG/1
1 TABLET ORAL EVERY 4 HOURS PRN
Qty: 15 TABLET | Refills: 0 | Status: SHIPPED | OUTPATIENT
Start: 2021-11-19 | End: 2021-11-22

## 2021-11-19 RX ORDER — KETOROLAC TROMETHAMINE 30 MG/ML
15 INJECTION, SOLUTION INTRAMUSCULAR; INTRAVENOUS
Status: COMPLETED | OUTPATIENT
Start: 2021-11-19 | End: 2021-11-19

## 2021-11-19 RX ORDER — DIPHENHYDRAMINE HYDROCHLORIDE 50 MG/ML
25 INJECTION INTRAMUSCULAR; INTRAVENOUS
Status: COMPLETED | OUTPATIENT
Start: 2021-11-19 | End: 2021-11-19

## 2021-11-19 RX ADMIN — DIPHENHYDRAMINE HYDROCHLORIDE 25 MG: 50 INJECTION INTRAMUSCULAR; INTRAVENOUS at 07:11

## 2021-11-19 RX ADMIN — SODIUM CHLORIDE 500 ML: 0.9 INJECTION, SOLUTION INTRAVENOUS at 07:11

## 2021-11-19 RX ADMIN — PROCHLORPERAZINE EDISYLATE 5 MG: 5 INJECTION INTRAMUSCULAR; INTRAVENOUS at 07:11

## 2021-11-19 RX ADMIN — KETOROLAC TROMETHAMINE 15 MG: 30 INJECTION, SOLUTION INTRAMUSCULAR; INTRAVENOUS at 07:11

## 2021-12-03 ENCOUNTER — HOSPITAL ENCOUNTER (EMERGENCY)
Facility: HOSPITAL | Age: 36
Discharge: HOME | End: 2021-12-03
Attending: EMERGENCY MEDICINE
Payer: MEDICAID

## 2021-12-03 ENCOUNTER — PATIENT OUTREACH (OUTPATIENT)
Dept: EMERGENCY MEDICINE | Facility: HOSPITAL | Age: 36
End: 2021-12-03
Payer: MEDICAID

## 2021-12-03 VITALS
HEART RATE: 95 BPM | TEMPERATURE: 98 F | DIASTOLIC BLOOD PRESSURE: 82 MMHG | WEIGHT: 119 LBS | OXYGEN SATURATION: 99 % | SYSTOLIC BLOOD PRESSURE: 133 MMHG | RESPIRATION RATE: 18 BRPM | HEIGHT: 62 IN | BODY MASS INDEX: 21.9 KG/M2

## 2021-12-03 DIAGNOSIS — S61.519A LACERATION OF WRIST: Primary | ICD-10-CM

## 2021-12-03 DIAGNOSIS — S61.412A LACERATION OF LEFT HAND WITHOUT FOREIGN BODY, INITIAL ENCOUNTER: ICD-10-CM

## 2021-12-03 PROCEDURE — 90715 TDAP VACCINE 7 YRS/> IM: CPT | Performed by: NURSE PRACTITIONER

## 2021-12-03 PROCEDURE — 12001 RPR S/N/AX/GEN/TRNK 2.5CM/<: CPT

## 2021-12-03 PROCEDURE — 63600175 PHARM REV CODE 636 W HCPCS: Performed by: NURSE PRACTITIONER

## 2021-12-03 PROCEDURE — 25000003 PHARM REV CODE 250: Performed by: NURSE PRACTITIONER

## 2021-12-03 PROCEDURE — 90471 IMMUNIZATION ADMIN: CPT | Performed by: NURSE PRACTITIONER

## 2021-12-03 PROCEDURE — 99284 EMERGENCY DEPT VISIT MOD MDM: CPT | Mod: 25

## 2021-12-03 RX ORDER — LIDOCAINE HYDROCHLORIDE 10 MG/ML
10 INJECTION INFILTRATION; PERINEURAL
Status: COMPLETED | OUTPATIENT
Start: 2021-12-03 | End: 2021-12-03

## 2021-12-03 RX ORDER — NAPROXEN 500 MG/1
500 TABLET ORAL
Status: COMPLETED | OUTPATIENT
Start: 2021-12-03 | End: 2021-12-03

## 2021-12-03 RX ADMIN — LIDOCAINE-EPINEPHRINE-TETRACAINE GEL 4-0.05-0.5% 1 ML: 4-0.05-0.5 GEL at 08:12

## 2021-12-03 RX ADMIN — TETANUS TOXOID, REDUCED DIPHTHERIA TOXOID AND ACELLULAR PERTUSSIS VACCINE, ADSORBED 0.5 ML: 5; 2.5; 8; 8; 2.5 SUSPENSION INTRAMUSCULAR at 08:12

## 2021-12-03 RX ADMIN — LIDOCAINE HYDROCHLORIDE 10 ML: 10 INJECTION, SOLUTION INFILTRATION; PERINEURAL at 08:12

## 2021-12-03 RX ADMIN — NAPROXEN 500 MG: 500 TABLET ORAL at 08:12

## 2021-12-14 ENCOUNTER — HOSPITAL ENCOUNTER (EMERGENCY)
Facility: HOSPITAL | Age: 36
Discharge: HOME | End: 2021-12-14
Attending: EMERGENCY MEDICINE
Payer: MEDICAID

## 2021-12-14 VITALS
SYSTOLIC BLOOD PRESSURE: 135 MMHG | DIASTOLIC BLOOD PRESSURE: 85 MMHG | OXYGEN SATURATION: 98 % | HEIGHT: 62 IN | HEART RATE: 93 BPM | BODY MASS INDEX: 22.26 KG/M2 | WEIGHT: 121 LBS | TEMPERATURE: 99 F | RESPIRATION RATE: 20 BRPM

## 2021-12-14 DIAGNOSIS — S61.512D LACERATION OF LEFT WRIST, SUBSEQUENT ENCOUNTER: ICD-10-CM

## 2021-12-14 DIAGNOSIS — Z48.02 VISIT FOR SUTURE REMOVAL: Primary | ICD-10-CM

## 2021-12-14 PROCEDURE — 25000003 PHARM REV CODE 250: Mod: ER | Performed by: NURSE PRACTITIONER

## 2021-12-14 PROCEDURE — 99283 EMERGENCY DEPT VISIT LOW MDM: CPT | Mod: ER

## 2021-12-14 RX ORDER — MUPIROCIN 20 MG/G
OINTMENT TOPICAL
Status: COMPLETED | OUTPATIENT
Start: 2021-12-14 | End: 2021-12-14

## 2021-12-14 RX ORDER — MUPIROCIN 20 MG/G
OINTMENT TOPICAL 3 TIMES DAILY
Qty: 30 G | Refills: 0 | Status: SHIPPED | OUTPATIENT
Start: 2021-12-14

## 2021-12-14 RX ADMIN — MUPIROCIN: 20 OINTMENT TOPICAL at 12:12

## 2022-01-02 ENCOUNTER — HOSPITAL ENCOUNTER (EMERGENCY)
Facility: HOSPITAL | Age: 37
Discharge: HOME | End: 2022-01-02
Attending: INTERNAL MEDICINE
Payer: MEDICAID

## 2022-01-02 VITALS
OXYGEN SATURATION: 98 % | HEIGHT: 62 IN | BODY MASS INDEX: 21.9 KG/M2 | SYSTOLIC BLOOD PRESSURE: 125 MMHG | TEMPERATURE: 98 F | DIASTOLIC BLOOD PRESSURE: 71 MMHG | HEART RATE: 91 BPM | RESPIRATION RATE: 16 BRPM | WEIGHT: 119 LBS

## 2022-01-02 DIAGNOSIS — Z11.52 ENCOUNTER FOR SCREENING FOR COVID-19: Primary | ICD-10-CM

## 2022-01-02 DIAGNOSIS — R09.1 PLEURISY: ICD-10-CM

## 2022-01-02 DIAGNOSIS — R07.9 CHEST PAIN: ICD-10-CM

## 2022-01-02 PROCEDURE — U0003 INFECTIOUS AGENT DETECTION BY NUCLEIC ACID (DNA OR RNA); SEVERE ACUTE RESPIRATORY SYNDROME CORONAVIRUS 2 (SARS-COV-2) (CORONAVIRUS DISEASE [COVID-19]), AMPLIFIED PROBE TECHNIQUE, MAKING USE OF HIGH THROUGHPUT TECHNOLOGIES AS DESCRIBED BY CMS-2020-01-R: HCPCS | Performed by: NURSE PRACTITIONER

## 2022-01-02 PROCEDURE — 93010 EKG 12-LEAD: ICD-10-PCS | Mod: ,,, | Performed by: INTERNAL MEDICINE

## 2022-01-02 PROCEDURE — 99284 EMERGENCY DEPT VISIT MOD MDM: CPT | Mod: 25,ER

## 2022-01-02 PROCEDURE — U0005 INFEC AGEN DETEC AMPLI PROBE: HCPCS | Performed by: NURSE PRACTITIONER

## 2022-01-02 PROCEDURE — 93010 ELECTROCARDIOGRAM REPORT: CPT | Mod: ,,, | Performed by: INTERNAL MEDICINE

## 2022-01-02 PROCEDURE — 93005 ELECTROCARDIOGRAM TRACING: CPT | Mod: ER

## 2022-01-02 RX ORDER — SULINDAC 150 MG/1
150 TABLET ORAL 2 TIMES DAILY
Qty: 10 TABLET | Refills: 0 | Status: SHIPPED | OUTPATIENT
Start: 2022-01-02

## 2022-01-02 RX ORDER — CYCLOBENZAPRINE HCL 10 MG
10 TABLET ORAL 3 TIMES DAILY PRN
Qty: 15 TABLET | Refills: 0 | Status: SHIPPED | OUTPATIENT
Start: 2022-01-02 | End: 2022-01-07

## 2022-01-02 NOTE — Clinical Note
"Jordana"Ray Davis was seen and treated in our emergency department on 1/2/2022.     COVID-19 is present in our communities across the state. There is limited testing for COVID at this time, so not all patients can be tested. In this situation, your employee meets the following criteria:    Jordana Davis has met the criteria for COVID-19 testing based upon symptoms, travel, and/or potential exposure. The test has been completed and is pending results at this time. During this time the employee is not able to work and should be quarantined per the Centers for Disease Control timelines.     If you have any questions or concerns, or if I can be of further assistance, please do not hesitate to contact me.    Sincerely,             Clarence Enamorado DNP"

## 2022-01-03 NOTE — ED PROVIDER NOTES
Encounter Date: 1/2/2022       History     Chief Complaint   Patient presents with    General Illness     Headache and chest congestion starting yesterday.      Chief complaint:  Encounter for COVID testing    History of present illness:  Patient is a 36-year-old female who reports fever yesterday that broke today, headache, congestion, chest pain worse with deep breath or cough, back pain.  She took an Aleve today for her discomfort.    The history is provided by the patient. No  was used.     Review of patient's allergies indicates:   Allergen Reactions    Azithromycin Other (See Comments)    Percocet [oxycodone-acetaminophen]      Past Medical History:   Diagnosis Date    Anxiety     GERD (gastroesophageal reflux disease)     Hiatal hernia     Irregular heart rate     Ovarian cyst     Scoliosis      Past Surgical History:   Procedure Laterality Date    DILATION AND CURETTAGE OF UTERUS      PARTIAL HYSTERECTOMY      TUBAL LIGATION       Family History   Problem Relation Age of Onset    Heart disease Father     Heart disease Paternal Grandmother      Social History     Tobacco Use    Smoking status: Current Every Day Smoker     Packs/day: 1.50     Types: Cigarettes    Smokeless tobacco: Never Used   Substance Use Topics    Alcohol use: Yes     Comment: OCCASIONAL    Drug use: Yes     Types: Marijuana     Review of Systems   Constitutional: Positive for fever. Negative for chills and fatigue.   HENT: Positive for congestion. Negative for ear discharge, ear pain, postnasal drip, rhinorrhea, sinus pressure, sneezing, sore throat and voice change.    Eyes: Negative for discharge and itching.   Respiratory: Negative for cough, shortness of breath and wheezing.    Cardiovascular: Positive for chest pain. Negative for palpitations and leg swelling.   Gastrointestinal: Negative for abdominal pain, constipation, diarrhea, nausea and vomiting.   Endocrine: Negative for polydipsia,  polyphagia and polyuria.   Genitourinary: Negative for dysuria, frequency, hematuria, urgency, vaginal bleeding, vaginal discharge and vaginal pain.   Musculoskeletal: Positive for back pain. Negative for arthralgias and myalgias.   Skin: Negative for rash and wound.   Neurological: Positive for headaches. Negative for dizziness, seizures, syncope, weakness and numbness.   Hematological: Negative for adenopathy. Does not bruise/bleed easily.   Psychiatric/Behavioral: Negative for self-injury and suicidal ideas. The patient is not nervous/anxious.        Physical Exam     Initial Vitals [01/02/22 1929]   BP Pulse Resp Temp SpO2   134/79 96 14 98.3 °F (36.8 °C) 100 %      MAP       --         Physical Exam    Nursing note and vitals reviewed.  Constitutional: She appears well-developed and well-nourished.   HENT:   Head: Normocephalic and atraumatic.   Right Ear: Hearing, tympanic membrane, external ear and ear canal normal.   Left Ear: Hearing, tympanic membrane, external ear and ear canal normal.   Nose: Nose normal. No mucosal edema or rhinorrhea. No epistaxis. Right sinus exhibits no maxillary sinus tenderness and no frontal sinus tenderness. Left sinus exhibits no maxillary sinus tenderness and no frontal sinus tenderness.   Mouth/Throat: Uvula is midline, oropharynx is clear and moist and mucous membranes are normal. No oral lesions. Normal dentition.   Eyes: Conjunctivae and EOM are normal. Pupils are equal, round, and reactive to light. Right eye exhibits no discharge. Left eye exhibits no discharge.   Neck:   Normal range of motion.  Cardiovascular: Regular rhythm, S1 normal, S2 normal and normal heart sounds. Exam reveals no gallop.    No murmur heard.  Pulmonary/Chest: Effort normal and breath sounds normal. No respiratory distress. She has no decreased breath sounds. She has no wheezes. She has no rhonchi. She has no rales.   Abdominal: She exhibits no distension.   Musculoskeletal:         General: Normal  range of motion.      Cervical back: Normal range of motion.     Neurological: She is alert and oriented to person, place, and time.   Skin: Skin is dry. Capillary refill takes less than 2 seconds.         ED Course   Procedures  Labs Reviewed   SARS-COV-2 (COVID-19) QUALITATIVE PCR     EKG Readings: (Independently Interpreted)   Initial Reading: No STEMI. Rhythm: Normal Sinus Rhythm. Heart Rate: 88. Ectopy: No Ectopy. T Waves Flipped: V1.       Imaging Results          X-Ray Chest AP Portable (Final result)  Result time 01/02/22 20:45:32    Final result by Filiberto Bustos MD (01/02/22 20:45:32)                 Impression:      No detrimental change or radiographic acute intrathoracic process seen on this single view.      Electronically signed by: Filiberto Bustos MD  Date:    01/02/2022  Time:    20:45             Narrative:    EXAMINATION:  XR CHEST AP PORTABLE    CLINICAL HISTORY:  Chest pain, unspecified    TECHNIQUE:  Single frontal view of the chest was performed.    COMPARISON:  Chest radiograph 10/21/2021    FINDINGS:  Trachea is midline and patent.The lungs are well expanded and clear, with normal appearance of pulmonary vasculature and no pleural effusion or pneumothorax.    The cardiac silhouette is normal in size. The hilar and mediastinal contours are unremarkable.    Bones are intact.                                 Medications - No data to display        APC / Resident Notes:   This is an evaluation of a 36 y.o. female that presents to the Emergency Department for sx as above. The patient is a non-toxic, afebrile, and well appearing female. On physical exam: No meningeal signs or cervical lymphadenopathy. Breath sounds clear without adventitious breath sounds, tachypnea or respiratory distress. Room air pulse ox of 98%. No hypoxia or dyspnea on exertion. Speaking in full sentences with no pauses or respiratory distress. Vital Signs Are Reassuring.     RESULTS: CXR: see below. COVID-19: pending    My  overall impression is COVID-19 pending. CP is likely pleuritic, will treat with nsaids and msk relaxants. I considered, but at this time, do not suspect OM, OE, strep pharyngitis, influenza, meningitis, pneumonia, bacterial sinusitis, or significant dehydration requiring IV fluids or admission. The patient is maintaining oxygen saturations > 95% on room air and does not require supplemental oxygen.     The patient will be discharged home with supportive care and recommendations for quarantine. Return precautions discussed to return if patient develops fevers, SOB, or other worsening symptoms. The diagnosis, treatment plan, instructions for follow-up and reevaluation with Primary Care as well as ED return precautions were discussed and understanding was verbalized. All questions or concerns have been addressed.         ED Course as of 01/02/22 2138   Sun Jan 02, 2022 2059 X-Ray Chest AP Portable    No detrimental change or radiographic acute intrathoracic process seen on this single view.    [VC]      ED Course User Index  [VC] Clarence Enamorado DNP             Clinical Impression:   Final diagnoses:  [R07.9] Chest pain  [R09.1] Pleurisy  [Z11.52] Encounter for screening for COVID-19 (Primary)          ED Disposition Condition    Discharge Stable        ED Prescriptions     Medication Sig Dispense Start Date End Date Auth. Provider    sulindac (CLINORIL) 150 MG tablet Take 1 tablet (150 mg total) by mouth 2 (two) times daily. 10 tablet 1/2/2022  Clarence Enamorado DNP    cyclobenzaprine (FLEXERIL) 10 MG tablet Take 1 tablet (10 mg total) by mouth 3 (three) times daily as needed for Muscle spasms. 15 tablet 1/2/2022 1/7/2022 Clarence Enamorado DNP        Follow-up Information     Follow up With Specialties Details Why Contact Info    Jerson Strange MD General Practice Schedule an appointment as soon as possible for a visit  Following Appropriate Period of Isloation [address] [facility]  [place]  [id number]  [phone]             Clarence Enamorado, [facility]  01/02/22 2130

## 2022-01-03 NOTE — DISCHARGE INSTRUCTIONS
You have been prescribed clinoril (sulindac), an anti-inflammatory.  Take this medication whether you feel you need it or not.  Do not take ibuprofen, naproxen or other NSAID's medications while taking this medication. You have also been prescribed flexeril (cyclobenzaprine).  You have been given a medication that causes drowsiness.  Do not operate motor vehicles, drink alcohol, or operate heavy machinery while taking this medication. Return to the Emergency Department for any worsening, change in condition, or any emergent concerns.     Alternate tylenol/ibuprofen every 3h as directed on packages.    Flonase as directed on package.     Return to the Emergency Department for any worsening, change in condition, or any emergent concerns.     Instructions for Patients with Confirmed or Suspected COVID-19    If you are awaiting your test result, you will either be called or it will be released to the patient portal.  If you have any questions about your test, please visit [email or web address] or call our COVID-19 information line at [phone].      Please isolate yourself at home.  You may leave home and/or return to work once the following conditions are met:    If you have symptoms and tested positive:  More than 5 days since symptoms first appeared AND  More than 24 hours fever free without medications AND       symptoms have improved   For five days after ending isolation, masks are required.    If you had no symptoms but tested positive:  More than 5 days since the date of the first positive test. If you develop symptoms, then use the guidelines above  For five days after ending isolation, masks are required.      Testing is not recommended if you are symptom free after completing isolation.

## 2022-01-04 LAB
SARS-COV-2 RNA RESP QL NAA+PROBE: DETECTED
SARS-COV-2- CYCLE NUMBER: 20

## 2022-01-25 ENCOUNTER — PES CALL (OUTPATIENT)
Dept: ADMINISTRATIVE | Facility: CLINIC | Age: 37
End: 2022-01-25
Payer: MEDICAID

## 2022-06-13 ENCOUNTER — HOSPITAL ENCOUNTER (EMERGENCY)
Facility: HOSPITAL | Age: 37
Discharge: HOME | End: 2022-06-13
Attending: EMERGENCY MEDICINE
Payer: MEDICAID

## 2022-06-13 VITALS
HEART RATE: 79 BPM | OXYGEN SATURATION: 99 % | WEIGHT: 120 LBS | DIASTOLIC BLOOD PRESSURE: 86 MMHG | SYSTOLIC BLOOD PRESSURE: 133 MMHG | BODY MASS INDEX: 21.95 KG/M2 | TEMPERATURE: 99 F | RESPIRATION RATE: 18 BRPM

## 2022-06-13 DIAGNOSIS — R07.89 CHEST WALL PAIN: Primary | ICD-10-CM

## 2022-06-13 DIAGNOSIS — R07.9 CHEST PAIN: ICD-10-CM

## 2022-06-13 DIAGNOSIS — F41.9 ANXIETY: ICD-10-CM

## 2022-06-13 LAB
B-HCG UR QL: NEGATIVE
CTP QC/QA: YES

## 2022-06-13 PROCEDURE — 81025 URINE PREGNANCY TEST: CPT | Mod: ER | Performed by: NURSE PRACTITIONER

## 2022-06-13 PROCEDURE — 99284 EMERGENCY DEPT VISIT MOD MDM: CPT | Mod: 25,ER

## 2022-06-13 RX ORDER — KETOROLAC TROMETHAMINE 30 MG/ML
30 INJECTION, SOLUTION INTRAMUSCULAR; INTRAVENOUS
Status: DISCONTINUED | OUTPATIENT
Start: 2022-06-13 | End: 2022-06-13 | Stop reason: HOSPADM

## 2022-06-13 RX ORDER — IBUPROFEN 600 MG/1
600 TABLET ORAL EVERY 6 HOURS PRN
Qty: 20 TABLET | Refills: 0 | Status: SHIPPED | OUTPATIENT
Start: 2022-06-13

## 2022-06-13 RX ORDER — METHOCARBAMOL 500 MG/1
1000 TABLET, FILM COATED ORAL NIGHTLY
Qty: 14 TABLET | Refills: 0 | Status: SHIPPED | OUTPATIENT
Start: 2022-06-13 | End: 2022-06-20

## 2022-06-13 RX ORDER — HYDROXYZINE HYDROCHLORIDE 10 MG/1
10 TABLET, FILM COATED ORAL 3 TIMES DAILY PRN
Qty: 12 TABLET | Refills: 0 | Status: SHIPPED | OUTPATIENT
Start: 2022-06-13

## 2022-06-13 RX ORDER — ORPHENADRINE CITRATE 30 MG/ML
60 INJECTION INTRAMUSCULAR; INTRAVENOUS
Status: DISCONTINUED | OUTPATIENT
Start: 2022-06-13 | End: 2022-06-13 | Stop reason: HOSPADM

## 2022-06-13 NOTE — DISCHARGE INSTRUCTIONS
Your xray and EKG are normal. Your pain is likely from inflammation or muscular.  The prescribed medications will help your pain. Take atarax as needed for anxiety.  Talk to your doctor about a daily medicine.

## 2022-06-13 NOTE — FIRST PROVIDER EVALUATION
" Emergency Department TeleTriage Encounter Note      CHIEF COMPLAINT    Chief Complaint   Patient presents with    Tingling    Muscle Pain     Pt states," The right side of my chest is hurting and It hurts more when I move. Also I have tingling on and off for three days in my hands and left jaw."       VITAL SIGNS   Initial Vitals [06/13/22 1052]   BP Pulse Resp Temp SpO2   (!) 141/90 87 16 98.6 °F (37 °C) 100 %      MAP       --            ALLERGIES    Review of patient's allergies indicates:   Allergen Reactions    Azithromycin Other (See Comments)    Percocet [oxycodone-acetaminophen]        PROVIDER TRIAGE NOTE  This is a teletriage evaluation of a 37 y.o. female presenting to the ED complaining of right sided CP with movement for three days. No trauma. Denies abd pain.  No SOB.     Initial orders will be placed and care will be transferred to an alternate provider when patient is roomed for a full evaluation. Any additional orders and the final disposition will be determined by that provider.           ORDERS  Labs Reviewed - No data to display    ED Orders (720h ago, onward)    Start Ordered     Status Ordering Provider    06/13/22 1116 06/13/22 1115  X-Ray Chest PA And Lateral  1 time imaging         Ordered BERHANE BARR N.    06/13/22 1116 06/13/22 1115  POCT urine pregnancy  Once         Ordered BERHANE BARR N.    06/13/22 1055 06/13/22 1054  EKG 12-lead  Once         Ordered KALYAN JENNINGS            Virtual Visit Note: The provider triage portion of this emergency department evaluation and documentation was performed via [employer], a HIPAA-compliant telemedicine application, in concert with a tele-presenter in the room. A face to face patient evaluation with one of my colleagues will occur once the patient is placed in an emergency department room.      DISCLAIMER: This note was prepared with [employer] voice recognition transcription software. Garbled syntax, mangled pronouns, and " other bizarre constructions may be attributed to that software system.

## 2022-06-13 NOTE — ED PROVIDER NOTES
"Encounter Date: 6/13/2022    SCRIBE #1 NOTE: I, Aydee Saucedo, am scribing for, and in the presence of,  Esha Morrissey MD. I have scribed the following portions of the note - Other sections scribed: HPI; ROS; PE.       History     Chief Complaint   Patient presents with    Tingling    Muscle Pain     Pt states," The right side of my chest is hurting and It hurts more when I move. Also I have tingling on and off for three days in my hands and left jaw."     Jordana Davis is a 37 y.o. female with Hx of Anxiety who presents to the ED for chief complaint of right posterior chest pain x 3 days.  Pain occurs with certain movements.  She reports associated fatigue and intermittent tingling to the left jaw/left hand. She states she has been really shaky lately and feels like her anxiety is acting up.  She notes an intermittent headache as well. She denies cough. Patient reports she has 2-3 beers every evening and uses tobacco as well.  She did not take anything for her pain.  She does not take anything for anxiety.      The history is provided by the patient. No  was used.     Review of patient's allergies indicates:   Allergen Reactions    Azithromycin Other (See Comments)    Percocet [oxycodone-acetaminophen]      Past Medical History:   Diagnosis Date    Anxiety     GERD (gastroesophageal reflux disease)     Hiatal hernia     Irregular heart rate     Ovarian cyst     Scoliosis      Past Surgical History:   Procedure Laterality Date    DILATION AND CURETTAGE OF UTERUS      PARTIAL HYSTERECTOMY      TUBAL LIGATION       Family History   Problem Relation Age of Onset    Heart disease Father     Heart disease Paternal Grandmother      Social History     Tobacco Use    Smoking status: Current Every Day Smoker     Packs/day: 1.50     Types: Cigarettes    Smokeless tobacco: Never Used   Substance Use Topics    Alcohol use: Yes     Comment: OCCASIONAL    Drug use: Yes     Types: " Marijuana     Review of Systems   Constitutional: Positive for fatigue. Negative for activity change, appetite change, chills and fever.   HENT: Negative for congestion, rhinorrhea, sneezing and sore throat.    Respiratory: Negative for cough, choking, shortness of breath and wheezing.    Cardiovascular: Positive for chest pain. Negative for palpitations.   Gastrointestinal: Negative for abdominal pain, diarrhea, nausea and vomiting.   Neurological: Positive for numbness and headaches. Negative for dizziness, syncope, weakness and light-headedness.   All other systems reviewed and are negative.      Physical Exam     Initial Vitals [06/13/22 1052]   BP Pulse Resp Temp SpO2   (!) 141/90 87 16 98.6 °F (37 °C) 100 %      MAP       --         Physical Exam    Nursing note and vitals reviewed.  Constitutional: Vital signs are normal. She appears well-developed and well-nourished. She is cooperative. She does not appear ill. No distress.   HENT:   Head: Normocephalic and atraumatic.   Mouth/Throat: Uvula is midline and mucous membranes are normal.   Eyes: Conjunctivae and lids are normal.   Neck: Neck supple.   Normal range of motion.  Cardiovascular: Normal rate, regular rhythm, S1 normal, S2 normal and normal heart sounds.   No murmur heard.  Pulmonary/Chest: Effort normal and breath sounds normal. No respiratory distress. She has no wheezes.   Abdominal: Abdomen is soft. Bowel sounds are normal. She exhibits no distension. There is no abdominal tenderness.   Musculoskeletal:         General: Normal range of motion.      Cervical back: Normal range of motion and neck supple.      Lumbar back: Tenderness present.        Back:       Comments: Muscle tenderness where noted     Neurological: She is alert and oriented to person, place, and time.   Skin: Skin is warm, dry and intact.   Psychiatric: She has a normal mood and affect. Her speech is normal and behavior is normal.         ED Course   Procedures  Labs Reviewed    POCT URINE PREGNANCY     EKG Readings: (Independently Interpreted)   Initial Reading: No STEMI. Rhythm: Normal Sinus Rhythm. Heart Rate: 88. Ectopy: No Ectopy. Conduction: Normal. ST Segments: Normal ST Segments. T Waves: Normal. Clinical Impression: Normal Sinus Rhythm       Imaging Results          X-Ray Chest PA And Lateral (Final result)  Result time 06/13/22 12:15:25    Final result by Chandra Chapman III, MD (06/13/22 12:15:25)                 Narrative:    EXAMINATION:  XR CHEST PA AND LATERAL    CLINICAL HISTORY:  Chest pain, unspecified    IMPRESSION  No acute process seen.      Electronically signed by: Chandra Chapman MD  Date:    06/13/2022  Time:    12:15                               Medications   ketorolac injection 30 mg (30 mg Intramuscular Not Given 6/13/22 1415)   orphenadrine injection 60 mg (60 mg Intramuscular Not Given 6/13/22 1415)     Medical Decision Making:   Clinical Tests:   Lab Tests: Ordered and Reviewed  Radiological Study: Ordered and Reviewed  Medical Tests: Ordered and Reviewed  ED Management:  37F with right sided posterior chest/thoracic back pain x 3 days.  No cough or fever.  ALso anxiety.  EKG and CXR are normal.  Muscle is tender - will treat with motrin/robaxin.  Atarax for anxiety.          Scribe Attestation:   Scribe #1: I performed the above scribed service and the documentation accurately describes the services I performed. I attest to the accuracy of the note.               I, Dr. Esha Morrissey, personally performed the services described in this documentation.   All medical record entries made by the scribe were at my direction and in my presence.   I have reviewed the chart and agree that the record is accurate and complete.   Esha Morrissey MD.  12:21 PM 06/13/2022     Clinical Impression:   Final diagnoses:  [R07.9] Chest pain  [R07.89] Chest wall pain (Primary)  [F41.9] Anxiety          ED Disposition Condition    Discharge Stable        ED Prescriptions     Medication  Sig Dispense Start Date End Date Auth. Provider    ibuprofen (ADVIL,MOTRIN) 600 MG tablet Take 1 tablet (600 mg total) by mouth every 6 (six) hours as needed for Pain. 20 tablet 6/13/2022  Esha Morrissey MD    methocarbamoL (ROBAXIN) 500 MG Tab Take 2 tablets (1,000 mg total) by mouth every evening. for 7 days 14 tablet 6/13/2022 6/20/2022 Esha Morrissey MD    hydrOXYzine HCL (ATARAX) 10 MG Tab Take 1 tablet (10 mg total) by mouth 3 (three) times daily as needed for Anxiety. 12 tablet 6/13/2022  Esha Morrissey MD        Follow-up Information     Follow up With Specialties Details Why Contact Info    Jerson Strange MD General Practice   [address] [place] [id number]  [phone]             Esha Morrissey MD  06/13/22 [id number]

## 2022-06-13 NOTE — Clinical Note
"Jordana"Ray Davis was seen and treated in our emergency department on 6/13/2022.  She may return to work on 06/14/2022.       If you have any questions or concerns, please don't hesitate to call.      Esha Morrissey MD"

## 2023-04-09 NOTE — DISCHARGE INSTRUCTIONS
Thank you for allowing me to care for you today.  I hope our treatment plan will make you feel better in the next few days.  In order for me to take better care of my future patients and improve our Emergency Department, I would appreciate if you can provide us with feedback.  In the next few days, you may receive a survey in the mail.  If you do, it would mean a great deal to me if you would please take the time to complete it.    Thank you and I hope you feel better.  Soraya Robles NP    0

## 2023-09-18 ENCOUNTER — HOSPITAL ENCOUNTER (EMERGENCY)
Facility: HOSPITAL | Age: 38
Discharge: HOME | End: 2023-09-18
Attending: EMERGENCY MEDICINE
Payer: MEDICAID

## 2023-09-18 VITALS
TEMPERATURE: 98 F | HEIGHT: 62 IN | SYSTOLIC BLOOD PRESSURE: 138 MMHG | WEIGHT: 125 LBS | BODY MASS INDEX: 23 KG/M2 | OXYGEN SATURATION: 100 % | HEART RATE: 84 BPM | DIASTOLIC BLOOD PRESSURE: 92 MMHG | RESPIRATION RATE: 17 BRPM

## 2023-09-18 DIAGNOSIS — K21.9 GASTROESOPHAGEAL REFLUX DISEASE, UNSPECIFIED WHETHER ESOPHAGITIS PRESENT: ICD-10-CM

## 2023-09-18 DIAGNOSIS — F41.9 ANXIETY: Primary | ICD-10-CM

## 2023-09-18 PROCEDURE — 25000003 PHARM REV CODE 250: Performed by: EMERGENCY MEDICINE

## 2023-09-18 PROCEDURE — 99284 EMERGENCY DEPT VISIT MOD MDM: CPT

## 2023-09-18 RX ORDER — LORAZEPAM 0.5 MG/1
1 TABLET ORAL
Status: DISCONTINUED | OUTPATIENT
Start: 2023-09-18 | End: 2023-09-18

## 2023-09-18 RX ORDER — LORAZEPAM 0.5 MG/1
1 TABLET ORAL
Status: COMPLETED | OUTPATIENT
Start: 2023-09-18 | End: 2023-09-18

## 2023-09-18 RX ORDER — LORAZEPAM 1 MG/1
0.5 TABLET ORAL EVERY 12 HOURS PRN
Qty: 10 TABLET | Refills: 0 | Status: SHIPPED | OUTPATIENT
Start: 2023-09-18 | End: 2023-10-18

## 2023-09-18 RX ORDER — OMEPRAZOLE 40 MG/1
40 CAPSULE, DELAYED RELEASE ORAL DAILY
Qty: 30 CAPSULE | Refills: 1 | Status: SHIPPED | OUTPATIENT
Start: 2023-09-18 | End: 2024-09-17

## 2023-09-18 RX ADMIN — LORAZEPAM 1 MG: 0.5 TABLET ORAL at 10:09

## 2023-09-18 NOTE — ED NOTES
Pt to ER with reports of increased chest pain/pressure with numbness on both arms since Wednesday. Pt reports taking a THC gummy on Wednesday to help with her anxiety but hasten been feeling right since.

## 2023-09-18 NOTE — ED PROVIDER NOTES
"Encounter Date: 9/18/2023    SCRIBE #1 NOTE: I, Tyler Maxwell, am scribing for, and in the presence of,  Kieran Mcnally MD. I have scribed the following portions of the note - Other sections scribed: HPI, ROS, PE, EKG.       History     Chief Complaint   Patient presents with    Chest Pain    Anxiety     Since Wednesday having chest pressure, weigh also increased anxiety tingling to both hands intermittently, appears dorothy in triage     Jordana Davis is a 38 y.o. female with a PMHx of anxiety, irregular HR, and PSHx of hysterectomy presents to the ED for CP onset 5 days ago. Associated symptoms are anxiety, chest "heaviness" and dizziness described as passing out in characteristic.Patient reports taking CBD/THC gummies 5 days ago and states that her anxiety has worsened since. States that she has been "shaking" since. No medications taken PTA. No alleviating or exacerbating factors noted. Denies any associated symptoms. Endorses drinking 1 beer daily and smoking.       The history is provided by the patient. No  was used.     Review of patient's allergies indicates:   Allergen Reactions    Azithromycin Other (See Comments)    Percocet [oxycodone-acetaminophen]      Past Medical History:   Diagnosis Date    Anxiety     GERD (gastroesophageal reflux disease)     Hiatal hernia     Irregular heart rate     Ovarian cyst     Scoliosis      Past Surgical History:   Procedure Laterality Date    DILATION AND CURETTAGE OF UTERUS      PARTIAL HYSTERECTOMY      TUBAL LIGATION       Family History   Problem Relation Age of Onset    Heart disease Father     Heart disease Paternal Grandmother      Social History     Tobacco Use    Smoking status: Every Day     Current packs/day: 1.50     Types: Cigarettes    Smokeless tobacco: Never   Substance Use Topics    Alcohol use: Yes     Comment: OCCASIONAL    Drug use: Yes     Types: Marijuana     Review of Systems   Constitutional:  Negative for fever. "   HENT:  Negative for congestion, sore throat and trouble swallowing.    Respiratory:  Negative for cough and shortness of breath.    Cardiovascular:  Positive for chest pain.   Gastrointestinal:  Negative for abdominal pain, constipation, diarrhea, nausea and vomiting.   Genitourinary:  Negative for dysuria, flank pain, frequency and urgency.   Musculoskeletal:  Negative for back pain.   Skin:  Negative for rash.   Neurological:  Positive for dizziness. Negative for headaches.   Psychiatric/Behavioral:  The patient is nervous/anxious.        Physical Exam     Initial Vitals [09/18/23 0917]   BP Pulse Resp Temp SpO2   (!) 149/89 104 20 98.2 °F (36.8 °C) 100 %      MAP       --         Physical Exam    Nursing note and vitals reviewed.  Constitutional: She appears well-developed and well-nourished.   HENT:   Head: Normocephalic and atraumatic.   Eyes: EOM are normal. Pupils are equal, round, and reactive to light.   Neck: Neck supple. No thyromegaly present. No JVD present.   Normal range of motion.  Cardiovascular:  Normal rate, regular rhythm, normal heart sounds and intact distal pulses.     Exam reveals no gallop and no friction rub.       No murmur heard.  Pulmonary/Chest: Breath sounds normal. No respiratory distress.   Abdominal: Abdomen is soft. Bowel sounds are normal.   Musculoskeletal:         General: No tenderness or edema. Normal range of motion.      Cervical back: Normal range of motion and neck supple.     Neurological: She is alert and oriented to person, place, and time. She has normal strength.   Skin: Skin is warm and dry.   Psychiatric: Her mood appears anxious.         ED Course   Procedures  Labs Reviewed - No data to display  EKG Readings: (Independently Interpreted)   Initial Reading: No STEMI. Rhythm: Sinus Tachycardia. Heart Rate: 104. ST Segments: Normal ST Segments. Clinical Impression: Sinus Tachycardia       Imaging Results    None          Medications   LORazepam tablet 1 mg (1 mg  Oral Given 9/18/23 1021)     Medical Decision Making  Amount and/or Complexity of Data Reviewed  ECG/medicine tests: ordered and independent interpretation performed. Decision-making details documented in ED Course.    Risk  Prescription drug management.    Lesion of symptoms with therapy provided.  Vital signs stable.  Stable for discharge.  Lengthy discussion regarding benzos is not a long-term treatment option.  He is follow up with primary care or mental health clinic for long-term treatment.  Advised not to self treat with THC gummies and alcohol.        Scribe Attestation:   Scribe #1: I performed the above scribed service and the documentation accurately describes the services I performed. I attest to the accuracy of the note.                I, Kieran Jay, personally performed the services described in this documentation. All medical record entries made by the scribe were at my direction and in my presence. I have reviewed the chart and agree that the record reflects my personal performance and is accurate and complete.                Clinical Impression:   Final diagnoses:  [F41.9] Anxiety (Primary)  [K21.9] Gastroesophageal reflux disease, unspecified whether esophagitis present        ED Disposition Condition    Discharge Stable          ED Prescriptions       Medication Sig Dispense Start Date End Date Auth. Provider    LORazepam (ATIVAN) 1 MG tablet Take 0.5 tablets (0.5 mg total) by mouth every 12 (twelve) hours as needed for Anxiety. 10 tablet 9/18/2023 10/18/2023 Kieran Jay MD    omeprazole (PRILOSEC) 40 MG capsule Take 1 capsule (40 mg total) by mouth once daily. 30 capsule 9/18/2023 9/17/2024 Kieran Jay MD          Follow-up Information       Follow up With Specialties Details Why Contact Info    Jerson Strange MD General Practice Call today for follow up this week to start long term treatment. [address]  [place] [address]  [phone]      Grayson Torre  [facility] -  Schedule an appointment as soon as possible for a visit  or follow up here [address] [facility] [address]  [place] [address]  [phone]               Kieran Mcnally MD  09/18/23 121

## 2023-09-18 NOTE — ED NOTES
Assumed pt care. Pt alert and oriented x4. Pt denies any complaints at the moment. Side rails up x2 with call light within reach.

## 2023-09-18 NOTE — DISCHARGE INSTRUCTIONS
[facility] [phone], After hours, nights, and weekends, you can reach a primary care on-call provider at [phone] and a behavioral health mobile crisis line at [phone]:  [facility]  [address]  [address] [place], [place] [address]THOMAS alab  [address] [phone]  Hours: Monday - Friday  7:00 a.m. - 5:00 p.m.          MENTAL HEALTH/ADDICTIVE DISORDERS  REFERRAL RECOMMENDATIONS    - AA ([phone]) [email or web address] or NA ([phone])    - [facility] Intensive Outpatient Program [phone], option 2    - Places for Outpatient Addictive Disorders and Mental Health Treatment in [facility]:    [facility] ([place]; accepts Medicaid, commercial insurance) [phone]    [facility] ([place]) [phone], [address] [facility] [phone]; Crisis [phone] - Call for options A-E:    ? [facility], [address] [place] [address]    ? [facility], [address] [place] [address]    ? [facility], [address] [place] [address],    ? [facility], 2nd floor [address] [place] [address]    ? [facility], [address], [place] [address]    ? [facility], [place] [address]    [facility], [address], [phone]    [facility], [place]ta/[place]guyễn/[place] [phone]    [facility] (Mental & General), [address] [facility], [phone]    [facility] (Mental & General Health, not only HIV+, 3 [place] locations) [phone]    [facility], [address] [place], [address], [phone]     [facility], [address] [place] [address] [place], [phone], [phone]    Behavioral Health  Group (Methadone Maintenance)    ? [address] [place] [address], [phone]    ? [place] [address] [phone]    - Addiction and Mental Health Treatment in Other [facility]:    [facility], [address] [place], [phone]    [facility], [phone], [address] [facility], [address], [phone]    [facility]. [address] [facility], [phone]    [facility], [address] [facility], [phone]    [facility]    ? [facility], [address] [facility], [phone] ([facility])    ? [facility], [address] [facility], [phone] ([facility])    ? [facility], [address] [facility], [place], [phone] ([place] [place], [facility])    ? [facility], [address], [place], [phone] ([facility])    [facility] Hotline [phone], [phone]    ? [facility], [address] [facility]    ? [facility], [address] [facility], [place]    ? [facility], [address] [facility], [place]    ? [facility], [address] [facility] [address] [place]    ? [facility], [address], [place]    [facility], [address], [place] [phone]    [facility], [address] [facility] [phone]    [facility], [address] [facility], [phone]    [facility], [address] [facility], [phone]    - Residential Addictive Disorders Treatment (call every day until you get in):    [facility] [address] [facility], [phone]    [facility] (men only) [address], [phone]    [facility], [address] [facility], mens program [phone], womens program [phone]    [facility], [address] [facility], [phone]    [facility] (Female only) [address] [facility], [phone]    [facility] (IOP, residential, low cost, MCaid) [address], [phone]    [facility] (Men only, [phone]), [address] [facility] [address], [place]    [facility] House (Pregnant/women with or without children, [phone])    [facility] (Women only), [address] [facility], [phone]    [facility] (men only), [place] [phone]    - Inpatient Rehabs (out of area)    [place], [place], [place], [phone]     [facility], [phone]    [facility], [phone]    [place], [place] ([phone])    [place] ([place]) [phone]    *In case of suicidal thinking, return to ED and/or call or text the COPE LINE at 335*

## 2023-11-22 ENCOUNTER — OFFICE VISIT (OUTPATIENT)
Dept: URGENT CARE | Facility: CLINIC | Age: 38
End: 2023-11-22